# Patient Record
Sex: MALE | Race: WHITE | NOT HISPANIC OR LATINO | Employment: FULL TIME | URBAN - METROPOLITAN AREA
[De-identification: names, ages, dates, MRNs, and addresses within clinical notes are randomized per-mention and may not be internally consistent; named-entity substitution may affect disease eponyms.]

---

## 2018-04-30 ENCOUNTER — OFFICE VISIT (OUTPATIENT)
Dept: FAMILY MEDICINE | Facility: CLINIC | Age: 53
End: 2018-04-30
Attending: FAMILY MEDICINE
Payer: COMMERCIAL

## 2018-04-30 VITALS
TEMPERATURE: 98.2 F | DIASTOLIC BLOOD PRESSURE: 72 MMHG | HEART RATE: 98 BPM | RESPIRATION RATE: 16 BRPM | BODY MASS INDEX: 25.14 KG/M2 | SYSTOLIC BLOOD PRESSURE: 118 MMHG | WEIGHT: 175.6 LBS | HEIGHT: 70 IN | OXYGEN SATURATION: 96 %

## 2018-04-30 DIAGNOSIS — E11.9 CONTROLLED TYPE 2 DIABETES MELLITUS WITHOUT COMPLICATION, UNSPECIFIED LONG TERM INSULIN USE STATUS: Primary | ICD-10-CM

## 2018-04-30 DIAGNOSIS — E78.00 PURE HYPERCHOLESTEROLEMIA: ICD-10-CM

## 2018-04-30 PROBLEM — Z88.6 HISTORY OF ALLERGY TO ASPIRIN: Status: ACTIVE | Noted: 2017-11-29

## 2018-04-30 PROCEDURE — 99213 OFFICE O/P EST LOW 20 MIN: CPT | Performed by: FAMILY MEDICINE

## 2018-04-30 RX ORDER — BLOOD SUGAR DIAGNOSTIC
STRIP MISCELLANEOUS
Refills: 1 | COMMUNITY
Start: 2018-04-05 | End: 2018-09-07 | Stop reason: SDUPTHER

## 2018-04-30 RX ORDER — AZELASTINE HCL 205.5 UG/1
SPRAY NASAL
COMMUNITY
Start: 2017-12-22 | End: 2019-05-06

## 2018-04-30 RX ORDER — LOSARTAN POTASSIUM 25 MG/1
25 TABLET ORAL
COMMUNITY
Start: 2018-01-30 | End: 2018-04-30 | Stop reason: SDUPTHER

## 2018-04-30 RX ORDER — METFORMIN HYDROCHLORIDE EXTENDED-RELEASE TABLETS 500 MG/1
500 TABLET, FILM COATED, EXTENDED RELEASE ORAL 2 TIMES DAILY WITH MEALS
COMMUNITY
Start: 2018-01-30 | End: 2019-03-24 | Stop reason: SDUPTHER

## 2018-04-30 RX ORDER — LOSARTAN POTASSIUM 25 MG/1
25 TABLET ORAL DAILY
Qty: 90 TABLET | Refills: 1 | Status: SHIPPED | OUTPATIENT
Start: 2018-04-30 | End: 2018-09-05 | Stop reason: SDUPTHER

## 2018-04-30 RX ORDER — BENZONATATE 100 MG/1
100 CAPSULE ORAL
COMMUNITY
Start: 2017-12-22 | End: 2019-05-06

## 2018-04-30 RX ORDER — DEXTROSE 4 G
TABLET,CHEWABLE ORAL
COMMUNITY
Start: 2017-10-29

## 2018-04-30 RX ORDER — MINERAL OIL
180 ENEMA (ML) RECTAL
COMMUNITY
Start: 2011-10-01

## 2018-04-30 RX ORDER — ATORVASTATIN CALCIUM 10 MG/1
10 TABLET, FILM COATED ORAL
COMMUNITY
Start: 2018-01-30 | End: 2018-09-06 | Stop reason: SDUPTHER

## 2018-04-30 ASSESSMENT — ENCOUNTER SYMPTOMS
FEVER: 0
ARTHRALGIAS: 0
APPETITE CHANGE: 0
FATIGUE: 0
COUGH: 0
FLANK PAIN: 0
EYE PAIN: 0
DIAPHORESIS: 0
ADENOPATHY: 0
WEAKNESS: 0
SORE THROAT: 0
ABDOMINAL PAIN: 0
PALPITATIONS: 0
UNEXPECTED WEIGHT CHANGE: 0
NAUSEA: 0
DIARRHEA: 0
DIZZINESS: 0
EYE REDNESS: 0
HEADACHES: 0
VOMITING: 0
SHORTNESS OF BREATH: 0
BLOOD IN STOOL: 0
CHEST TIGHTNESS: 0
MYALGIAS: 0
CONSTIPATION: 0
ACTIVITY CHANGE: 0
DYSURIA: 0

## 2018-04-30 NOTE — ASSESSMENT & PLAN NOTE
LDL target is less than 70 and achieved with a atorvastatin.  Next labs in July.  Office visit 6 months if all goes well.

## 2018-04-30 NOTE — PROGRESS NOTES
"Subjective   Doing well, FBS averages 115 on Metformin 2000 mg daily.  He has gained 4 lbs since January, feels fine.His most recent labs were reviewed at the last office visit.  Denies med side effects.     Patient ID: Samuel Patricia is a 52 y.o. male.    HPI    The following have been reviewed and updated as appropriate in this visit:       Review of Systems   Constitutional: Negative for activity change, appetite change, diaphoresis, fatigue, fever and unexpected weight change.   HENT: Negative for congestion and sore throat.    Eyes: Negative for pain, redness and visual disturbance.   Respiratory: Negative for cough, chest tightness and shortness of breath.    Cardiovascular: Negative for chest pain and palpitations.   Gastrointestinal: Negative for abdominal pain, blood in stool, constipation, diarrhea, nausea and vomiting.   Genitourinary: Negative for dysuria and flank pain.   Musculoskeletal: Negative for arthralgias and myalgias.   Skin: Negative for rash.   Neurological: Negative for dizziness, weakness and headaches.   Hematological: Negative for adenopathy.   Psychiatric/Behavioral: Negative for behavioral problems.       Objective     Vitals:    04/30/18 0758   BP: 118/72   BP Location: Left upper arm   Patient Position: Sitting   Pulse: 98   Resp: 16   Temp: 36.8 °C (98.2 °F)   TempSrc: Oral   SpO2: 96%   Weight: 79.7 kg (175 lb 9.6 oz)   Height: 1.778 m (5' 10\")     Body mass index is 25.2 kg/m².    Physical Exam   Constitutional: He appears well-developed and well-nourished. He is cooperative. No distress.   HENT:   Head: Normocephalic and atraumatic.   Right Ear: External ear normal.   Left Ear: External ear normal.   Nose: Nose normal.   Mouth/Throat: Oropharynx is clear and moist.   Eyes: Conjunctivae, EOM and lids are normal. Pupils are equal, round, and reactive to light. Right eye exhibits no chemosis. Left eye exhibits no chemosis. Right conjunctiva is not injected. Left conjunctiva is not " injected.   Fundoscopic exam:       The right eye shows no exudate and no papilledema.        The left eye shows no exudate and no papilledema.   Neck: Full passive range of motion without pain. No JVD present. Carotid bruit is not present. No thyroid mass and no thyromegaly present.   Cardiovascular: Normal rate, regular rhythm and intact distal pulses.  PMI is not displaced.  Exam reveals no gallop and no friction rub.    No murmur heard.  Pulses:       Dorsalis pedis pulses are 2+ on the right side, and 2+ on the left side.        Posterior tibial pulses are 2+ on the right side, and 2+ on the left side.   Pulmonary/Chest: Effort normal and breath sounds normal. No respiratory distress. He has no wheezes. He has no rales. He exhibits no tenderness.   Abdominal: Soft. Bowel sounds are normal. There is no tenderness.   Musculoskeletal:        Right foot: There is no deformity.        Left foot: There is no deformity.   Feet:   Right Foot:   Protective Sensation: 5 sites tested. 5 sites sensed.   Skin Integrity: Negative for ulcer, blister, skin breakdown, erythema or warmth.   Left Foot:   Protective Sensation: 5 sites tested. 5 sites sensed.   Skin Integrity: Negative for ulcer, blister, skin breakdown, erythema or warmth.   Lymphadenopathy:        Head (right side): No tonsillar adenopathy present.        Head (left side): No tonsillar adenopathy present.     He has no cervical adenopathy.     He has no axillary adenopathy.   Neurological: He is alert. He has normal reflexes. No cranial nerve deficit or sensory deficit.   Skin: Skin is warm and dry. No lesion and no rash noted.   Psychiatric: He has a normal mood and affect. His speech is normal and behavior is normal. Cognition and memory are normal.       Assessment/Plan   Problem List Items Addressed This Visit     Controlled type 2 diabetes mellitus without complication (CMS/HCC) (HCC) - Primary     He is doing well, next labs are due in July, orders  provided.  Continue same management         Relevant Medications    metFORMIN (FORTAMET) 500 mg 24 hr tablet    Other Relevant Orders    Comprehensive metabolic panel    Hemoglobin A1c    Microalbumin/Creatinine Ur Random    Pure hypercholesterolemia     LDL target is less than 70 and achieved with a atorvastatin.  Next labs in July.  Office visit 6 months if all goes well.         Relevant Medications    atorvastatin (LIPITOR) 10 mg tablet    Other Relevant Orders    Lipid panel

## 2018-07-25 LAB
ALBUMIN SERPL-MCNC: 4.6 G/DL (ref 3.5–5.5)
ALBUMIN/CREAT UR: 9 MG/G CREAT (ref 0–30)
ALBUMIN/GLOB SERPL: 1.7 {RATIO} (ref 1.2–2.2)
ALP SERPL-CCNC: 55 IU/L (ref 39–117)
ALT SERPL-CCNC: 33 IU/L (ref 0–44)
AST SERPL-CCNC: 49 IU/L (ref 0–40)
BILIRUB SERPL-MCNC: 0.6 MG/DL (ref 0–1.2)
BUN SERPL-MCNC: 10 MG/DL (ref 6–24)
BUN/CREAT SERPL: 12 (ref 9–20)
CALCIUM SERPL-MCNC: 9.5 MG/DL (ref 8.7–10.2)
CHLORIDE SERPL-SCNC: 103 MMOL/L (ref 96–106)
CHOLEST SERPL-MCNC: 159 MG/DL (ref 100–199)
CO2 SERPL-SCNC: 24 MMOL/L (ref 20–29)
CREAT SERPL-MCNC: 0.86 MG/DL (ref 0.76–1.27)
CREAT UR-MCNC: 175.6 MG/DL
GLOBULIN SER CALC-MCNC: 2.7 G/DL (ref 1.5–4.5)
GLUCOSE SERPL-MCNC: 125 MG/DL (ref 65–99)
HBA1C MFR BLD: 5.5 % (ref 4.8–5.6)
HDLC SERPL-MCNC: 59 MG/DL
LAB CORP EGFR IF AFRICN AM: 116 ML/MIN/1.73
LAB CORP EGFR IF NONAFRICN AM: 100 ML/MIN/1.73
LDLC SERPL CALC-MCNC: 87 MG/DL (ref 0–99)
MICROALBUMIN UR-MCNC: 15.8 UG/ML
POTASSIUM SERPL-SCNC: 4.6 MMOL/L (ref 3.5–5.2)
PROT SERPL-MCNC: 7.3 G/DL (ref 6–8.5)
SODIUM SERPL-SCNC: 143 MMOL/L (ref 134–144)
TRIGL SERPL-MCNC: 67 MG/DL (ref 0–149)
VLDLC SERPL CALC-MCNC: 13 MG/DL (ref 5–40)

## 2018-07-26 ENCOUNTER — TELEPHONE (OUTPATIENT)
Dept: FAMILY MEDICINE | Facility: CLINIC | Age: 53
End: 2018-07-26

## 2018-07-26 NOTE — TELEPHONE ENCOUNTER
Left detailed message for pt informing him of lab results. I instducted him to call the office if he has any questions.      ----- Message from Ash Nam MD sent at 7/26/2018 11:01 AM EDT -----  Notify patient of lab results.  Lipid levels have crept up above target, LDL was 71 6 months ago now to 87: Try to tighten up on the diet to get that back down without additional medications.  Glucose and A1c indicate well-controlled diabetes with fasting glucose above normal at 125 but hemoglobin A1c has actually dropped from 6.0-5.5.  1 of the liver tests, the AST, is slightly above normal at 49 but not worrisome.  Stick with current regimen at this time.

## 2018-07-27 ENCOUNTER — TELEPHONE (OUTPATIENT)
Dept: FAMILY MEDICINE | Facility: CLINIC | Age: 53
End: 2018-07-27

## 2018-07-27 NOTE — TELEPHONE ENCOUNTER
Already completed.     ----- Message from Ash Nam MD sent at 7/26/2018 11:01 AM EDT -----  Notify patient of lab results.  Lipid levels have crept up above target, LDL was 71 6 months ago now to 87: Try to tighten up on the diet to get that back down without additional medications.  Glucose and A1c indicate well-controlled diabetes with fasting glucose above normal at 125 but hemoglobin A1c has actually dropped from 6.0-5.5.  1 of the liver tests, the AST, is slightly above normal at 49 but not worrisome.  Stick with current regimen at this time.

## 2018-09-06 RX ORDER — LOSARTAN POTASSIUM 25 MG/1
25 TABLET ORAL DAILY
Qty: 90 TABLET | Refills: 0 | Status: SHIPPED | OUTPATIENT
Start: 2018-09-06 | End: 2019-03-24 | Stop reason: SDUPTHER

## 2018-09-06 RX ORDER — ATORVASTATIN CALCIUM 10 MG/1
10 TABLET, FILM COATED ORAL DAILY
Qty: 90 TABLET | Refills: 0 | Status: SHIPPED | OUTPATIENT
Start: 2018-09-06 | End: 2018-11-11 | Stop reason: SDUPTHER

## 2018-09-06 NOTE — TELEPHONE ENCOUNTER
Pt req refills.  Last seen 4/2018  Labs end of July adv to cont the current regimen  Due for f/up appt end of Oct,  Pt due in 10/29/18

## 2018-09-07 RX ORDER — BLOOD SUGAR DIAGNOSTIC
STRIP MISCELLANEOUS
Qty: 100 STRIP | Refills: 1 | Status: SHIPPED | OUTPATIENT
Start: 2018-09-07 | End: 2021-01-29 | Stop reason: SDUPTHER

## 2018-10-29 ENCOUNTER — OFFICE VISIT (OUTPATIENT)
Dept: FAMILY MEDICINE | Facility: CLINIC | Age: 53
End: 2018-10-29
Payer: COMMERCIAL

## 2018-10-29 VITALS
HEART RATE: 86 BPM | BODY MASS INDEX: 25.05 KG/M2 | DIASTOLIC BLOOD PRESSURE: 70 MMHG | SYSTOLIC BLOOD PRESSURE: 122 MMHG | RESPIRATION RATE: 16 BRPM | HEIGHT: 70 IN | OXYGEN SATURATION: 98 % | WEIGHT: 175 LBS

## 2018-10-29 DIAGNOSIS — E11.9 CONTROLLED TYPE 2 DIABETES MELLITUS WITHOUT COMPLICATION, WITHOUT LONG-TERM CURRENT USE OF INSULIN (CMS/HCC): Primary | ICD-10-CM

## 2018-10-29 DIAGNOSIS — S39.012A SACROILIAC STRAIN, INITIAL ENCOUNTER: ICD-10-CM

## 2018-10-29 DIAGNOSIS — Z23 NEED FOR INFLUENZA VACCINATION: ICD-10-CM

## 2018-10-29 DIAGNOSIS — E78.00 PURE HYPERCHOLESTEROLEMIA: ICD-10-CM

## 2018-10-29 DIAGNOSIS — Z12.9 SCREENING OF CANCER: ICD-10-CM

## 2018-10-29 PROCEDURE — 90471 IMMUNIZATION ADMIN: CPT | Performed by: FAMILY MEDICINE

## 2018-10-29 PROCEDURE — 99213 OFFICE O/P EST LOW 20 MIN: CPT | Mod: 25 | Performed by: FAMILY MEDICINE

## 2018-10-29 PROCEDURE — 90686 IIV4 VACC NO PRSV 0.5 ML IM: CPT | Performed by: FAMILY MEDICINE

## 2018-10-29 ASSESSMENT — ENCOUNTER SYMPTOMS
FEVER: 0
CHILLS: 0
COUGH: 0
HEMATURIA: 0
ACTIVITY CHANGE: 0
WOUND: 0
FLANK PAIN: 0
SPEECH DIFFICULTY: 0
ABDOMINAL PAIN: 0
NUMBNESS: 0
NAUSEA: 0
EYE PAIN: 0
SHORTNESS OF BREATH: 0
ENDOCRINE COMMENTS: DENIES SEVERE HYPOGLYCEMIA, OR SYMPTOMS OF  DIZZINESS, CONFUSION, SYNCOPE, TREMOR.
SORE THROAT: 0
POLYDIPSIA: 0
APPETITE CHANGE: 0
FREQUENCY: 0
DIAPHORESIS: 0
BLOOD IN STOOL: 0
TREMORS: 0
CONSTIPATION: 0
JOINT SWELLING: 0
DIARRHEA: 0
ARTHRALGIAS: 0
EYE REDNESS: 0
HEADACHES: 0
TROUBLE SWALLOWING: 0
DIZZINESS: 0
UNEXPECTED WEIGHT CHANGE: 0
SINUS PRESSURE: 0
DIFFICULTY URINATING: 0
PALPITATIONS: 0
FATIGUE: 0
WEAKNESS: 0

## 2018-10-29 NOTE — PATIENT INSTRUCTIONS
Stay on current meds.  Labs in mid January.    Controlled type 2 diabetes mellitus without complication (CMS/HCC) (HCC)  Metformin plus diet.Under excellent control overall.  Stick with    Need for influenza vaccination  Flu vaccine administered.    Sacroiliac strain  Conservative management with rest, ibuprofen versus Tylenol as needed, consider chiropractic.  If not improved within 2 weeks call for x-ray orders.

## 2018-10-29 NOTE — PROGRESS NOTES
"Subjective  Returns for follow-up of diabetes mellitus hyperlipidemia.  He has never been hypertensive and takes low-dose losartan for renal protective effect.  Last labs were in July 2018:    FBS runs 110-122, last A1C  In July 5.5.  Diet is well controlled.      LDL has  Climbed to high 80s.    Recently pulled  His low back to  Right ischial region.  This is occurred in the past and responded to chiropractic.  Pain is gradually improving.     Patient ID: Samuel Patricia is a 53 y.o. male.  1965      HPI    The following have been reviewed and updated as appropriate in this visit:       Review of Systems   Constitutional: Negative for activity change, appetite change, chills, diaphoresis, fatigue, fever and unexpected weight change.   HENT: Negative for ear pain, mouth sores, sinus pressure, sore throat and trouble swallowing.    Eyes: Negative for pain, redness and visual disturbance.   Respiratory: Negative for cough and shortness of breath.    Cardiovascular: Negative for chest pain and palpitations.   Gastrointestinal: Negative for abdominal pain, blood in stool, constipation, diarrhea and nausea.   Endocrine: Negative for polydipsia and polyuria.        Denies severe hypoglycemia, or symptoms of  dizziness, confusion, syncope, tremor.   Genitourinary: Negative for difficulty urinating, flank pain, frequency, hematuria and urgency.   Musculoskeletal: Negative for arthralgias and joint swelling.   Skin: Negative for rash and wound.   Neurological: Negative for dizziness, tremors, syncope, speech difficulty, weakness, numbness and headaches.       Objective     Vitals:    10/29/18 0800   BP: 122/70   BP Location: Right upper arm   Patient Position: Sitting   Pulse: 86   Resp: 16   SpO2: 98%   Weight: 79.4 kg (175 lb)   Height: 1.778 m (5' 10\")     Body mass index is 25.11 kg/m².    Physical Exam   Constitutional: He appears well-developed and well-nourished. He is cooperative. No distress.   HENT:   Head: " Normocephalic and atraumatic.   Right Ear: External ear normal.   Left Ear: External ear normal.   Nose: Nose normal.   Mouth/Throat: Oropharynx is clear and moist.   Eyes: Conjunctivae, EOM and lids are normal. Pupils are equal, round, and reactive to light. Right eye exhibits no chemosis. Left eye exhibits no chemosis. Right conjunctiva is not injected. Left conjunctiva is not injected.   Fundoscopic exam:       The right eye shows no exudate and no papilledema.        The left eye shows no exudate and no papilledema.   Neck: Full passive range of motion without pain. No JVD present. Carotid bruit is not present. No thyroid mass and no thyromegaly present.   Cardiovascular: Normal rate, regular rhythm and intact distal pulses.  PMI is not displaced.  Exam reveals no gallop and no friction rub.    No murmur heard.  Pulses:       Dorsalis pedis pulses are 2+ on the right side, and 2+ on the left side.        Posterior tibial pulses are 2+ on the right side, and 2+ on the left side.   Pulmonary/Chest: Effort normal and breath sounds normal. No respiratory distress. He has no wheezes. He has no rales. He exhibits no tenderness.   Abdominal: Soft. Bowel sounds are normal. There is no tenderness.   Musculoskeletal:        Right foot: There is no deformity.        Left foot: There is no deformity.   Low back exam: Mild tenderness at right sacroiliac joint no midline spinal tenderness.  Straight leg raises are negative to 90 degrees, lower extremity sensation motor reflexes were all within normal limits.  Intact vibratory and filament sensory exam as noted.  Minor tenderness over ischial tuberosity/ischial bursa   Feet:   Right Foot:   Protective Sensation: 5 sites tested. 5 sites sensed.   Skin Integrity: Negative for ulcer, blister, skin breakdown, erythema or warmth.   Left Foot:   Protective Sensation: 5 sites tested. 5 sites sensed.   Skin Integrity: Negative for ulcer, blister, skin breakdown, erythema or warmth.    Lymphadenopathy:        Head (right side): No tonsillar adenopathy present.        Head (left side): No tonsillar adenopathy present.     He has no cervical adenopathy.     He has no axillary adenopathy.   Neurological: He is alert. He has normal reflexes. No cranial nerve deficit or sensory deficit.   Skin: Skin is warm and dry. No lesion and no rash noted.   Psychiatric: He has a normal mood and affect. His speech is normal and behavior is normal. Cognition and memory are normal.       Assessment/Plan   Problem List Items Addressed This Visit     Controlled type 2 diabetes mellitus without complication (CMS/HCC) (HCC) - Primary     Metformin plus diet.Under excellent control overall.  Stick with         Relevant Orders    Comprehensive metabolic panel    Lipid panel    Hemoglobin A1c    Microalbumin/Creatinine Ur Random    Pure hypercholesterolemia    Relevant Orders    Lipid panel    Need for influenza vaccination     Flu vaccine administered.         Relevant Orders    Influenza vaccine quadrivalent preservative free 6 mon and older IM (FluLaval) (Completed)    Screening of cancer    Relevant Orders    PSA    Sacroiliac strain     Conservative management with rest, ibuprofen versus Tylenol as needed, consider chiropractic.  If not improved within 2 weeks call for x-ray orders.

## 2018-12-05 ENCOUNTER — TELEPHONE (OUTPATIENT)
Dept: FAMILY MEDICINE | Facility: CLINIC | Age: 53
End: 2018-12-05

## 2018-12-05 ENCOUNTER — OFFICE VISIT (OUTPATIENT)
Dept: FAMILY MEDICINE | Facility: CLINIC | Age: 53
End: 2018-12-05
Payer: COMMERCIAL

## 2018-12-05 VITALS
SYSTOLIC BLOOD PRESSURE: 120 MMHG | HEART RATE: 98 BPM | TEMPERATURE: 98.1 F | HEIGHT: 69 IN | RESPIRATION RATE: 16 BRPM | DIASTOLIC BLOOD PRESSURE: 70 MMHG | WEIGHT: 174 LBS | BODY MASS INDEX: 25.77 KG/M2 | OXYGEN SATURATION: 100 %

## 2018-12-05 DIAGNOSIS — M53.3 SACROILIAC JOINT DYSFUNCTION OF RIGHT SIDE: Primary | ICD-10-CM

## 2018-12-05 PROCEDURE — 99213 OFFICE O/P EST LOW 20 MIN: CPT | Performed by: FAMILY MEDICINE

## 2018-12-05 RX ORDER — NAPROXEN 500 MG/1
500 TABLET ORAL 2 TIMES DAILY WITH MEALS
Qty: 30 TABLET | Refills: 0 | Status: SHIPPED | OUTPATIENT
Start: 2018-12-05 | End: 2019-06-03

## 2018-12-05 NOTE — PATIENT INSTRUCTIONS
Sacroiliac joint dysfunction of right side  Conservative management is advised with chiropractic treatment.  I reviewed his drug allergy history and it is actually quite equivocal in that, while he thinks he is allergic to aspirin from 40 years ago, has had at one time nasal congestion after ibuprofen also states that he has taken Advil numerous times without any reaction whatsoever.  His aspirin allergy was likewise described as nasal congestion only, no rash no epistaxis no anaphylaxis.  Based on that, he agrees to close observation on trial of Naprosyn therapy but will discontinue it if he develops any recurrence of nasal congestion/obstruction or other allergy related symptoms.    I advised him that corticosteroid injection is not indicated at this early juncture but if conservative management fails might be a consideration.    We discussed the possibility of getting fresh set of x-rays on the spine but will proceed only if he does not achieve rapid improvement over the next week or so.

## 2018-12-05 NOTE — ASSESSMENT & PLAN NOTE
Conservative management is advised with chiropractic treatment.  I reviewed his drug allergy history and it is actually quite equivocal in that, while he thinks he is allergic to aspirin from 40 years ago, has had at one time nasal congestion after ibuprofen also states that he has taken Advil numerous times without any reaction whatsoever.  His aspirin allergy was likewise described as nasal congestion only, no rash no epistaxis no anaphylaxis.  Based on that, he agrees to close observation on trial of Naprosyn therapy but will discontinue it if he develops any recurrence of nasal congestion/obstruction or other allergy related symptoms.    I advised him that corticosteroid injection is not indicated at this early juncture but if conservative management fails might be a consideration.    We discussed the possibility of getting fresh set of x-rays on the spine but will proceed only if he does not achieve rapid improvement over the next week or so.

## 2018-12-05 NOTE — PROGRESS NOTES
"Subjective  Over the years this patient has had perhaps 3 flareups of right SI pain.,  He had new onset of pain [place], denies lifting or overuse/ lifting.  Pain kept him awake last night, and he wondered if he was a candidate for injection.      Used Chiropractor [facility] for same in past but felt the program was stretched out unnecessarily.    Denies referred pain to leg/ buttocks.   Last xrays over 10 yrs ago per pt.     Patient ID: Samuel Patricia is a 53 y.o. male.  1965      HPI    The following have been reviewed and updated as appropriate in this visit:       Review of Systems    Objective     Vitals:    12/05/18 1006   BP: 120/70   BP Location: Right upper arm   Patient Position: Sitting   Pulse: 98   Resp: 16   Temp: 36.7 °C (98.1 °F)   SpO2: 100%   Weight: 78.9 kg (174 lb)   Height: 1.753 m (5' 9\")     Body mass index is 25.7 kg/m².    Physical Exam   Musculoskeletal:   Low back exam exhibits some localized midline spinal tenderness around L2-3, but the focus of his greatest complaint is at the right sacroiliac joint, superior aspect, where he exhibits moderate tenderness, symmetrical sacroiliac cysts are present.  Straight leg raises are negative to 90 degrees, lower extremity sensation motor reflexes are intact.       Assessment/Plan   Problem List Items Addressed This Visit     Sacroiliac joint dysfunction of right side - Primary     Conservative management is advised with chiropractic treatment.  I reviewed his drug allergy history and it is actually quite equivocal in that, while he thinks he is allergic to aspirin from 40 years ago, has had at one time nasal congestion after ibuprofen also states that he has taken Advil numerous times without any reaction whatsoever.  His aspirin allergy was likewise described as nasal congestion only, no rash no epistaxis no anaphylaxis.  Based on that, he agrees to close observation on trial of Naprosyn therapy but will " discontinue it if he develops any recurrence of nasal congestion/obstruction or other allergy related symptoms.    I advised him that corticosteroid injection is not indicated at this early juncture but if conservative management fails might be a consideration.    We discussed the possibility of getting fresh set of x-rays on the spine but will proceed only if he does not achieve rapid improvement over the next week or so.         Relevant Orders    Ambulatory referral to Chiropractic

## 2018-12-05 NOTE — TELEPHONE ENCOUNTER
Patient called, still having SI joint pain- had been manageable, only slight pain but today woke up and it is much worse.    Please advise    [phone]    dk    Patient scheduled appt for 10am today

## 2019-03-24 RX ORDER — LOSARTAN POTASSIUM 25 MG/1
25 TABLET ORAL DAILY
Qty: 90 TABLET | Refills: 0 | Status: SHIPPED | OUTPATIENT
Start: 2019-03-24 | End: 2019-05-06 | Stop reason: SDUPTHER

## 2019-03-24 RX ORDER — METFORMIN HYDROCHLORIDE EXTENDED-RELEASE TABLETS 500 MG/1
500 TABLET, FILM COATED, EXTENDED RELEASE ORAL 2 TIMES DAILY WITH MEALS
Qty: 90 TABLET | Refills: 0 | Status: SHIPPED | OUTPATIENT
Start: 2019-03-24 | End: 2019-05-06

## 2019-04-19 LAB
ALBUMIN SERPL-MCNC: 4.7 G/DL (ref 3.5–5.5)
ALBUMIN/GLOB SERPL: 1.7 {RATIO} (ref 1.2–2.2)
ALP SERPL-CCNC: 60 IU/L (ref 39–117)
ALT SERPL-CCNC: 23 IU/L (ref 0–44)
AST SERPL-CCNC: 34 IU/L (ref 0–40)
BILIRUB SERPL-MCNC: 0.8 MG/DL (ref 0–1.2)
BUN SERPL-MCNC: 13 MG/DL (ref 6–24)
BUN/CREAT SERPL: 15 (ref 9–20)
CALCIUM SERPL-MCNC: 9.6 MG/DL (ref 8.7–10.2)
CHLORIDE SERPL-SCNC: 104 MMOL/L (ref 96–106)
CHOLEST SERPL-MCNC: 119 MG/DL (ref 100–199)
CO2 SERPL-SCNC: 21 MMOL/L (ref 20–29)
CREAT SERPL-MCNC: 0.89 MG/DL (ref 0.76–1.27)
GLOBULIN SER CALC-MCNC: 2.7 G/DL (ref 1.5–4.5)
GLUCOSE SERPL-MCNC: 125 MG/DL (ref 65–99)
HBA1C MFR BLD: 5.8 % (ref 4.8–5.6)
HDLC SERPL-MCNC: 48 MG/DL
LAB CORP EGFR IF AFRICN AM: 114 ML/MIN/1.73
LAB CORP EGFR IF NONAFRICN AM: 98 ML/MIN/1.73
LDLC SERPL CALC-MCNC: 57 MG/DL (ref 0–99)
POTASSIUM SERPL-SCNC: 4.3 MMOL/L (ref 3.5–5.2)
PROT SERPL-MCNC: 7.4 G/DL (ref 6–8.5)
SODIUM SERPL-SCNC: 141 MMOL/L (ref 134–144)
TRIGL SERPL-MCNC: 70 MG/DL (ref 0–149)
VLDLC SERPL CALC-MCNC: 14 MG/DL (ref 5–40)

## 2019-04-20 LAB
ALBUMIN/CREAT UR: 15.3 MG/G CREAT (ref 0–30)
CREAT UR-MCNC: 222.9 MG/DL
MICROALBUMIN UR-MCNC: 34.2 UG/ML
PSA SERPL-MCNC: 0.5 NG/ML (ref 0–4)

## 2019-05-03 RX ORDER — METFORMIN HYDROCHLORIDE 500 MG/1
TABLET, EXTENDED RELEASE ORAL
COMMUNITY
Start: 2019-01-25 | End: 2019-05-06 | Stop reason: SDUPTHER

## 2019-05-06 ENCOUNTER — OFFICE VISIT (OUTPATIENT)
Dept: FAMILY MEDICINE | Facility: CLINIC | Age: 54
End: 2019-05-06
Payer: COMMERCIAL

## 2019-05-06 VITALS
TEMPERATURE: 98.6 F | SYSTOLIC BLOOD PRESSURE: 132 MMHG | DIASTOLIC BLOOD PRESSURE: 86 MMHG | OXYGEN SATURATION: 98 % | BODY MASS INDEX: 26.22 KG/M2 | HEIGHT: 69 IN | WEIGHT: 177 LBS | RESPIRATION RATE: 18 BRPM | HEART RATE: 87 BPM

## 2019-05-06 DIAGNOSIS — E11.9 CONTROLLED TYPE 2 DIABETES MELLITUS WITHOUT COMPLICATION, WITHOUT LONG-TERM CURRENT USE OF INSULIN (CMS/HCC): Primary | ICD-10-CM

## 2019-05-06 DIAGNOSIS — E78.00 PURE HYPERCHOLESTEROLEMIA: ICD-10-CM

## 2019-05-06 DIAGNOSIS — I10 ESSENTIAL HYPERTENSION: ICD-10-CM

## 2019-05-06 PROCEDURE — 99213 OFFICE O/P EST LOW 20 MIN: CPT | Performed by: FAMILY MEDICINE

## 2019-05-06 RX ORDER — ATORVASTATIN CALCIUM 10 MG/1
10 TABLET, FILM COATED ORAL
Qty: 90 TABLET | Refills: 3 | Status: SHIPPED | OUTPATIENT
Start: 2019-05-06 | End: 2020-03-31

## 2019-05-06 RX ORDER — METFORMIN HYDROCHLORIDE 500 MG/1
1000 TABLET, EXTENDED RELEASE ORAL 2 TIMES DAILY WITH MEALS
Qty: 360 TABLET | Refills: 3 | Status: SHIPPED | OUTPATIENT
Start: 2019-05-06 | End: 2020-06-15

## 2019-05-06 RX ORDER — LOSARTAN POTASSIUM 25 MG/1
25 TABLET ORAL DAILY
Qty: 90 TABLET | Refills: 1 | Status: SHIPPED | OUTPATIENT
Start: 2019-05-06 | End: 2019-09-14 | Stop reason: SDUPTHER

## 2019-05-06 ASSESSMENT — ENCOUNTER SYMPTOMS
SPEECH DIFFICULTY: 0
TROUBLE SWALLOWING: 0
EYE REDNESS: 0
APPETITE CHANGE: 0
ENDOCRINE COMMENTS: DENIES SEVERE HYPOGLYCEMIA, OR SYMPTOMS OF  DIZZINESS, CONFUSION, SYNCOPE, TREMOR.
WOUND: 0
DIAPHORESIS: 0
UNEXPECTED WEIGHT CHANGE: 0
COUGH: 0
POLYDIPSIA: 0
SHORTNESS OF BREATH: 0
NAUSEA: 0
CHEST TIGHTNESS: 0
HEADACHES: 0
ADENOPATHY: 0
DIARRHEA: 0
FATIGUE: 0
ARTHRALGIAS: 0
SINUS PRESSURE: 0
CONSTIPATION: 0
FEVER: 0
TREMORS: 0
ACTIVITY CHANGE: 0
DYSURIA: 0
WEAKNESS: 0
FREQUENCY: 0
FLANK PAIN: 0
EYE PAIN: 0
NUMBNESS: 0
DIFFICULTY URINATING: 0
VOMITING: 0
CHILLS: 0
JOINT SWELLING: 0
SORE THROAT: 0
DIZZINESS: 0
PALPITATIONS: 0
BLOOD IN STOOL: 0
HEMATURIA: 0
ABDOMINAL PAIN: 0

## 2019-05-06 NOTE — PROGRESS NOTES
Subjective  Patient returns for follow-up of diabetes, hyperlipidemia,.  Labs from last week showed good diabetes control with A1c of 5.8, fasting glucose 125, good lipids, at target with LDL 57 HDL 48 normal triglycerides, normal liver and kidney tests, negative microalbumin, normal PSA at 0.5.    He reports  occasional  Lightheadedness when working outdoors.  He has not followed home blood pressures.    He reports good response to chiropractic deep tissue massage in December.  ( albeit with bruising).     Patient ID: Samuel Patricia is a 53 y.o. male.  1965      HPI    The following have been reviewed and updated as appropriate in this visit:  Tobacco  Allergies  Meds  Problems       Review of Systems   Constitutional: Negative for activity change, appetite change, chills, diaphoresis, fatigue, fever and unexpected weight change (NO UNEXPLAINED WEIGHT LOSS).   HENT: Negative for ear pain, mouth sores, sinus pressure, sore throat and trouble swallowing.    Eyes: Negative for pain, redness and visual disturbance.   Respiratory: Negative for cough, chest tightness and shortness of breath.    Cardiovascular: Negative for chest pain and palpitations.   Gastrointestinal: Negative for abdominal pain, blood in stool, constipation, diarrhea, nausea and vomiting.   Endocrine: Negative for polydipsia and polyuria.        Denies severe hypoglycemia, or symptoms of  dizziness, confusion, syncope, tremor.   Genitourinary: Negative for difficulty urinating, dysuria, flank pain, frequency, hematuria and urgency.   Musculoskeletal: Negative for arthralgias and joint swelling.   Skin: Negative for rash and wound.   Neurological: Negative for dizziness, tremors, syncope, speech difficulty, weakness, numbness and headaches.   Hematological: Negative for adenopathy.       Objective     Vitals:    05/06/19 0806 05/06/19 0834 05/06/19 0835 05/06/19 0839   BP: 132/78 (!) 158/100 126/86 132/86   BP Location: Left upper arm Right  "upper arm  Right upper arm   Patient Position: Sitting      Pulse: 87      Resp: 18      Temp: 37 °C (98.6 °F)      TempSrc: Oral      SpO2: 98%      Weight: 80.3 kg (177 lb)      Height: 1.753 m (5' 9.02\")        Body mass index is 26.13 kg/m².    Physical Exam   Constitutional: He appears well-developed and well-nourished. He is cooperative. No distress.   HENT:   Head: Normocephalic and atraumatic.   Right Ear: External ear normal.   Left Ear: External ear normal.   Nose: Nose normal.   Mouth/Throat: Oropharynx is clear and moist.   Eyes: Pupils are equal, round, and reactive to light. Conjunctivae, EOM and lids are normal. Right eye exhibits no chemosis. Left eye exhibits no chemosis. Right conjunctiva is not injected. Left conjunctiva is not injected.   Fundoscopic exam:       The right eye shows no exudate and no papilledema.        The left eye shows no exudate and no papilledema.   Neck: Full passive range of motion without pain. No JVD present. Carotid bruit is not present. No thyroid mass and no thyromegaly present.   Cardiovascular: Normal rate, regular rhythm and intact distal pulses.  PMI is not displaced.  Exam reveals no gallop and no friction rub.    No murmur heard.  Pulses:       Dorsalis pedis pulses are 2+ on the right side, and 2+ on the left side.        Posterior tibial pulses are 2+ on the right side, and 2+ on the left side.   Pulmonary/Chest: Effort normal and breath sounds normal. No respiratory distress. He has no wheezes. He has no rales. He exhibits no tenderness.   Abdominal: Soft. Bowel sounds are normal. There is no tenderness.   Musculoskeletal:        Right foot: There is no deformity.        Left foot: There is no deformity.   Feet:   Right Foot:   Protective Sensation: 5 sites tested. 5 sites sensed.   Skin Integrity: Negative for ulcer, blister, skin breakdown, erythema or warmth.   Left Foot:   Protective Sensation: 5 sites tested. 5 sites sensed.   Skin Integrity: Negative for " ulcer, blister, skin breakdown, erythema or warmth.   Lymphadenopathy:        Head (right side): No tonsillar adenopathy present.        Head (left side): No tonsillar adenopathy present.     He has no cervical adenopathy.     He has no axillary adenopathy.   Neurological: He is alert. He has normal reflexes. No cranial nerve deficit or sensory deficit.   Skin: Skin is warm and dry. No lesion and no rash noted.   Psychiatric: He has a normal mood and affect. His speech is normal and behavior is normal. Cognition and memory are normal.       Assessment/Plan   Diagnoses and all orders for this visit:    Controlled type 2 diabetes mellitus without complication, without long-term current use of insulin (CMS/[facility]) (Primary)  Assessment & Plan:  Well-controlled with A1c 5.8 fasting glucose 125.  Continue current meds, follow-up 6 months.stay on low carb diet      Pure hypercholesterolemia  Assessment & Plan:  Lipids are at target with LDL 57.  Continue current Rx with a atorvastatin.      Essential hypertension  Assessment & Plan:  Resume Losartan,    Return in 4 weeks    We  reviewed our previous discussion on hypertension related morbidity, mortality, rationale for therapy, current meds and potential side effects of same, as well as nonpharmacologic measures which should be continued to help optimize blood pressure control including low-salt diet, regular cardio exercise, adequate hydration, stress management.    Follow-up as directed  .      Other orders  -     metFORMIN XR (GLUCOPHAGE-XR) 500 mg 24 hr tablet; Take 2 tablets (1,000 mg total) by mouth 2 (two) times a day with meals.  -     atorvastatin (LIPITOR) 10 mg tablet; Take 1 tablet (10 mg total) by mouth once daily.  -     losartan (COZAAR) 25 mg tablet; Take 1 tablet (25 mg total) by mouth daily.

## 2019-05-06 NOTE — ASSESSMENT & PLAN NOTE
Resume Losartan,    Return in 4 weeks    We  reviewed our previous discussion on hypertension related morbidity, mortality, rationale for therapy, current meds and potential side effects of same, as well as nonpharmacologic measures which should be continued to help optimize blood pressure control including low-salt diet, regular cardio exercise, adequate hydration, stress management.    Follow-up as directed  .

## 2019-05-06 NOTE — ASSESSMENT & PLAN NOTE
Well-controlled with A1c 5.8 fasting glucose 125.  Continue current meds, follow-up 6 months.stay on low carb diet

## 2019-05-06 NOTE — PATIENT INSTRUCTIONS
Controlled type 2 diabetes mellitus without complication (CMS/HCC) (HCC)  Well controlled: stay on low carb diet    Essential hypertension  Resume Losartan,    Return in 4 weeks    We  reviewed our previous discussion on hypertension related morbidity, mortality, rationale for therapy, current meds and potential side effects of same, as well as nonpharmacologic measures which should be continued to help optimize blood pressure control including low-salt diet, regular cardio exercise, adequate hydration, stress management.    Follow-up as directed  .

## 2019-06-07 ENCOUNTER — OFFICE VISIT (OUTPATIENT)
Dept: FAMILY MEDICINE | Facility: CLINIC | Age: 54
End: 2019-06-07
Payer: COMMERCIAL

## 2019-06-07 VITALS
SYSTOLIC BLOOD PRESSURE: 114 MMHG | DIASTOLIC BLOOD PRESSURE: 74 MMHG | RESPIRATION RATE: 18 BRPM | TEMPERATURE: 98.2 F | HEART RATE: 100 BPM | WEIGHT: 171 LBS | HEIGHT: 69 IN | BODY MASS INDEX: 25.33 KG/M2 | OXYGEN SATURATION: 98 %

## 2019-06-07 DIAGNOSIS — I10 ESSENTIAL HYPERTENSION: Primary | ICD-10-CM

## 2019-06-07 DIAGNOSIS — Z23 NEED FOR CHICKENPOX VACCINATION: ICD-10-CM

## 2019-06-07 DIAGNOSIS — E11.9 CONTROLLED TYPE 2 DIABETES MELLITUS WITHOUT COMPLICATION, WITHOUT LONG-TERM CURRENT USE OF INSULIN (CMS/HCC): ICD-10-CM

## 2019-06-07 PROCEDURE — 99213 OFFICE O/P EST LOW 20 MIN: CPT | Performed by: FAMILY MEDICINE

## 2019-11-27 LAB — HBA1C MFR BLD: 5.9 % (ref 4.8–5.6)

## 2019-11-28 LAB — VZV IGG SER IA-ACNC: 599 INDEX

## 2019-12-05 ENCOUNTER — OFFICE VISIT (OUTPATIENT)
Dept: FAMILY MEDICINE | Facility: CLINIC | Age: 54
End: 2019-12-05
Payer: COMMERCIAL

## 2019-12-05 VITALS
HEIGHT: 69 IN | OXYGEN SATURATION: 97 % | WEIGHT: 177.6 LBS | BODY MASS INDEX: 26.31 KG/M2 | DIASTOLIC BLOOD PRESSURE: 72 MMHG | SYSTOLIC BLOOD PRESSURE: 128 MMHG | RESPIRATION RATE: 18 BRPM | HEART RATE: 104 BPM | TEMPERATURE: 99.5 F

## 2019-12-05 DIAGNOSIS — J32.9 RHINOSINUSITIS: Primary | ICD-10-CM

## 2019-12-05 PROCEDURE — 99213 OFFICE O/P EST LOW 20 MIN: CPT | Performed by: NURSE PRACTITIONER

## 2019-12-05 RX ORDER — AZELASTINE HCL 205.5 UG/1
1 SPRAY NASAL 2 TIMES DAILY PRN
Qty: 30 ML | Refills: 1 | Status: SHIPPED | OUTPATIENT
Start: 2019-12-05 | End: 2020-06-02

## 2019-12-05 RX ORDER — BENZONATATE 100 MG/1
CAPSULE ORAL
Qty: 30 CAPSULE | Refills: 0 | Status: SHIPPED | OUTPATIENT
Start: 2019-12-05 | End: 2020-07-09

## 2019-12-05 ASSESSMENT — ENCOUNTER SYMPTOMS
SHORTNESS OF BREATH: 0
FATIGUE: 1
FEVER: 0
NAUSEA: 0
LIGHT-HEADEDNESS: 1
CONSTIPATION: 0
ABDOMINAL PAIN: 0
VOMITING: 0
COUGH: 1
CHILLS: 0
DIARRHEA: 0
WHEEZING: 0

## 2019-12-05 NOTE — PROGRESS NOTES
[facility]  [address] [place] [address]  [phone]     Reason for visit:   Chief Complaint   Patient presents with   • Cough     dry cough x 5 days, no shortness of breath, fatigued from sleep disturbance r/t cough      HPI   Samuel Patricia is a 54 y.o. male who presents with cough, interrupting sleep        Medical History:  Past Medical History:   Diagnosis Date   • Hx of gout        Surgical History:  Past Surgical History:   Procedure Laterality Date   • WISDOM TOOTH EXTRACTION         Social History:  Social History     Social History Narrative   • Not on file       Family History:  Family History   Problem Relation Age of Onset   • Stroke Biological Mother    • Stroke Biological Father    • Diabetes Biological Father    • Diabetes Biological Brother    • Diabetes Biological Brother        Allergies:  Acetaminophen; Aspirin; Ibuprofen; and Penicillins    Current Medications:  Current Outpatient Medications   Medication Sig Dispense Refill   • atorvastatin (LIPITOR) 10 mg tablet Take 1 tablet (10 mg total) by mouth once daily. 90 tablet 3   • blood-glucose meter (CONTOUR NEXT METER) misc test up to once daily in am before breakfast only.     • CONTOUR NEXT TEST STRIPS strip TEST BLOOD SUGAR ONCE DAILY 100 strip 1   • fexofenadine (ALLEGRA ALLERGY) 180 mg tablet 180 mg.     • losartan (COZAAR) 25 mg tablet TAKE 1 TABLET BY MOUTH  DAILY 90 tablet 0   • metFORMIN XR (GLUCOPHAGE-XR) 500 mg 24 hr tablet Take 2 tablets (1,000 mg total) by mouth 2 (two) times a day with meals. 360 tablet 3   • azelastine 0.15 % (205.5 mcg) nasal spray Administer 1 spray into each nostril 2 (two) times a day as needed for rhinitis. 30 mL 1   • benzonatate (TESSALON) 100 mg capsule Take 1 to 2 capsules every 8 hours as needed for cough 30 capsule 0     No current facility-administered medications for this visit.        Review of Systems:  Review of Systems   Constitutional: Positive for fatigue  (r/t not sleeping well). Negative for chills and fever.   Respiratory: Positive for cough (non-productive). Negative for shortness of breath and wheezing.    Cardiovascular: Negative for chest pain.   Gastrointestinal: Negative for abdominal pain, constipation, diarrhea, nausea and vomiting.   Neurological: Positive for light-headedness (occasionally).       Objective     Vital Signs:  Vitals:    12/05/19 1216   BP: 128/72   Pulse: (!) 104   Resp: 18   Temp: 37.5 °C (99.5 °F)   SpO2: 97%       BMI:  Body mass index is 26.23 kg/m².     Physical Exam   Constitutional: He is oriented to person, place, and time. He appears well-developed and well-nourished.   HENT:   Head: Normocephalic and atraumatic.   Right Ear: Hearing normal. Tympanic membrane is bulging.   Left Ear: Hearing normal. A middle ear effusion is present.   Nose: Mucosal edema present. Right sinus exhibits no maxillary sinus tenderness and no frontal sinus tenderness. Left sinus exhibits no maxillary sinus tenderness and no frontal sinus tenderness.   Mouth/Throat: Uvula is midline.   PND present   Cardiovascular: Regular rhythm and normal heart sounds. Tachycardia present.   HR 100s   Pulmonary/Chest: Effort normal and breath sounds normal.   Neurological: He is alert and oriented to person, place, and time.   Psychiatric: He has a normal mood and affect. His speech is normal and behavior is normal. Judgment and thought content normal. Cognition and memory are normal.       Recent labs before today:     Lab Results   Component Value Date    WBC 6.7 10/23/2017    HGB 17.1 10/23/2017    HCT 50.5 10/23/2017     10/23/2017    CHOL 119 04/19/2019    TRIG 70 04/19/2019    HDL 48 04/19/2019    ALT 23 04/19/2019    AST 34 04/19/2019     04/19/2019    K 4.3 04/19/2019     04/19/2019    CREATININE 0.89 04/19/2019    BUN 13 04/19/2019    CO2 21 04/19/2019    TSH 2.450 10/23/2017    PSA 0.5 04/19/2019    HGBA1C 5.9 (H) 11/27/2019    MICROALBUR  34.2 04/19/2019        Procedures   Assessment     Patient Instructions   Swollen nasal passages can cause postnasal drip and cough.  Azelastine for nasal drainage, Tessalon for cough.  Drink warm beverages such as tea to dissolve secretions already in your throat, avoid milk products, and maintain hydration.        Problem List Items Addressed This Visit     None      Visit Diagnoses     Rhinosinusitis    -  Primary                    SAMUEL Macias  12/5/2019

## 2019-12-05 NOTE — PATIENT INSTRUCTIONS
Swollen nasal passages can cause postnasal drip and cough.  Azelastine for nasal drainage, Tessalon for cough.  Drink warm beverages such as tea to dissolve secretions already in your throat, avoid milk products, and maintain hydration.

## 2019-12-09 ENCOUNTER — OFFICE VISIT (OUTPATIENT)
Dept: FAMILY MEDICINE | Facility: CLINIC | Age: 54
End: 2019-12-09
Payer: COMMERCIAL

## 2019-12-09 VITALS
HEART RATE: 99 BPM | TEMPERATURE: 98.8 F | BODY MASS INDEX: 25.77 KG/M2 | SYSTOLIC BLOOD PRESSURE: 120 MMHG | HEIGHT: 69 IN | OXYGEN SATURATION: 98 % | DIASTOLIC BLOOD PRESSURE: 78 MMHG | WEIGHT: 174 LBS | RESPIRATION RATE: 18 BRPM

## 2019-12-09 DIAGNOSIS — Z12.9 SCREENING OF CANCER: ICD-10-CM

## 2019-12-09 DIAGNOSIS — E78.00 PURE HYPERCHOLESTEROLEMIA: ICD-10-CM

## 2019-12-09 DIAGNOSIS — E11.9 CONTROLLED TYPE 2 DIABETES MELLITUS WITHOUT COMPLICATION, WITHOUT LONG-TERM CURRENT USE OF INSULIN (CMS/HCC): ICD-10-CM

## 2019-12-09 DIAGNOSIS — I10 ESSENTIAL HYPERTENSION: Primary | ICD-10-CM

## 2019-12-09 DIAGNOSIS — Z23 NEED FOR INFLUENZA VACCINATION: ICD-10-CM

## 2019-12-09 PROCEDURE — 99213 OFFICE O/P EST LOW 20 MIN: CPT | Mod: 25 | Performed by: FAMILY MEDICINE

## 2019-12-09 PROCEDURE — 90471 IMMUNIZATION ADMIN: CPT | Performed by: FAMILY MEDICINE

## 2019-12-09 PROCEDURE — 90686 IIV4 VACC NO PRSV 0.5 ML IM: CPT | Performed by: FAMILY MEDICINE

## 2019-12-09 ASSESSMENT — ENCOUNTER SYMPTOMS
UNEXPECTED WEIGHT CHANGE: 0
DYSURIA: 0
VOMITING: 0
SHORTNESS OF BREATH: 0
FEVER: 0
FATIGUE: 0
CHEST TIGHTNESS: 0
NAUSEA: 0
HEADACHES: 0
PALPITATIONS: 0
ADENOPATHY: 0

## 2019-12-09 NOTE — PROGRESS NOTES
"Subjective  Patient returns for follow-up of hypertension, diabetes mellitus, hyperlipidemia.  He is doing well overall and denies new complaints or concerns.  He is on maximal metformin monotherapy.    He is proven to have had prior exposure to chickenpox thus is a candidate for Shingrix vaccine as discussed.     Patient ID: Samuel Patricia is a 54 y.o. male.  1965      HPI    The following have been reviewed and updated as appropriate in this visit:  Allergies  Meds  Problems       Review of Systems   Constitutional: Negative for fatigue, fever and unexpected weight change (NO UNEXPLAINED WEIGHT LOSS).   Respiratory: Negative for chest tightness and shortness of breath.    Cardiovascular: Negative for chest pain and palpitations.   Gastrointestinal: Negative for nausea and vomiting.   Genitourinary: Negative for dysuria.   Skin: Negative for rash.   Neurological: Negative for headaches.   Hematological: Negative for adenopathy.       Objective     Vitals:    12/09/19 0805   BP: 120/78   BP Location: Left upper arm   Patient Position: Sitting   Pulse: 99   Resp: 18   Temp: 37.1 °C (98.8 °F)   TempSrc: Oral   SpO2: 98%   Weight: 78.9 kg (174 lb)   Height: 1.753 m (5' 9.02\")     Body mass index is 25.68 kg/m².    Physical Exam   Constitutional: He appears well-developed and well-nourished. He is cooperative. No distress.   HENT:   Head: Normocephalic and atraumatic.   Right Ear: External ear normal.   Left Ear: External ear normal.   Nose: Nose normal.   Mouth/Throat: Oropharynx is clear and moist.   Otic Canals clear, Tympanic membranes intact  Eyes: RAOUL, EOMI, no conjunctival injection  Nose and throat clear, no inflammation or exudate, no oral mucosal lesions  Neck supple, with intact range of motion, no mass, no lymphadenopathy, no thyromegaly, no thyroid nodule, no carotid bruit, no JVD.   Eyes: Pupils are equal, round, and reactive to light. Conjunctivae, EOM and lids are normal. Right eye exhibits " no chemosis. Left eye exhibits no chemosis. Right conjunctiva is not injected. Left conjunctiva is not injected.   Fundoscopic exam:       The right eye shows no exudate and no papilledema.        The left eye shows no exudate and no papilledema.   Neck: Full passive range of motion without pain. No JVD present. Carotid bruit is not present. No thyroid mass and no thyromegaly present.   Cardiovascular: Normal rate, regular rhythm, normal heart sounds and intact distal pulses. PMI is not displaced. Exam reveals no gallop and no friction rub.   No murmur heard.  Pulses:       Dorsalis pedis pulses are 2+ on the right side, and 2+ on the left side.        Posterior tibial pulses are 2+ on the right side, and 2+ on the left side.   Pulmonary/Chest: Effort normal and breath sounds normal. No respiratory distress. He has no wheezes. He has no rales. He exhibits no tenderness.   Abdominal: Soft. Bowel sounds are normal. There is no tenderness.   Musculoskeletal: He exhibits no edema.        Right foot: There is no deformity.        Left foot: There is no deformity.   Back no CVA tenderness    Legs: no edema, no jessica venous insufficiency changes   Feet:   Right Foot:   Protective Sensation: 5 sites tested. 5 sites sensed.   Skin Integrity: Negative for ulcer, blister, skin breakdown, erythema or warmth.   Left Foot:   Protective Sensation: 5 sites tested. 5 sites sensed.   Skin Integrity: Negative for ulcer, blister, skin breakdown, erythema or warmth.   Lymphadenopathy:        Head (right side): No tonsillar adenopathy present.        Head (left side): No tonsillar adenopathy present.     He has no cervical adenopathy.     He has no axillary adenopathy.   Neurological: He is alert. He has normal reflexes. No cranial nerve deficit or sensory deficit.   Skin: Skin is warm and dry. No lesion and no rash noted.   Psychiatric: He has a normal mood and affect. His speech is normal and behavior is normal. Cognition and memory  are normal.       Assessment/Plan   Diagnoses and all orders for this visit:    Essential hypertension (Primary)  Assessment & Plan:  Well-controlled: Continue current Rx, follow-up 6 months      Need for influenza vaccination  Assessment & Plan:  Flu shot administered.      Controlled type 2 diabetes mellitus without complication, without long-term current use of insulin (CMS/[facility])  Assessment & Plan:  Stable, A1c acceptable at 5.9.  Continue maximal metformin, improve diet.    Orders:  -     Comprehensive metabolic panel; Future  -     Urinalysis with Reflex Culture (ED and Outpatient only); Future  -     Microalbumin/Creatinine Ur Random; Future  -     Hemoglobin A1c; Future    Pure hypercholesterolemia  Assessment & Plan:  Stable on a atorvastatin.  Continue same.    Orders:  -     Lipid panel; Future    Screening of cancer  Assessment & Plan:  Annual PSA is due in April.  Comprehensive labs ordered.    Orders:  -     PSA; Future    Other orders  -     Influenza vaccine quadrivalent preservative free 6 mon and older IM (FluLaval)

## 2019-12-09 NOTE — PATIENT INSTRUCTIONS
Essential hypertension  Well-controlled: Continue current Rx, follow-up 6 months    Need for influenza vaccination  Flu shot administered.    Controlled type 2 diabetes mellitus without complication, without long-term current use of insulin (CMS/HCC)  Stable, A1c acceptable at 5.9.  Continue maximal metformin, improve diet.    Pure hypercholesterolemia  Stable on a atorvastatin.  Continue same.    Screening of cancer  Annual PSA is due in April.  Comprehensive labs ordered.

## 2019-12-11 ENCOUNTER — TELEPHONE (OUTPATIENT)
Dept: FAMILY MEDICINE | Facility: CLINIC | Age: 54
End: 2019-12-11

## 2019-12-11 NOTE — TELEPHONE ENCOUNTER
"----- Message from Ml Cho MA sent at 12/2/2019  8:47 AM EST -----      ----- Message -----  From: Ash Nam MD  Sent: 11/30/2019  11:48 AM EST  To: Ml Cho MA    Notify pt he has never been exposed to chickenpox so has no risk for shingles and does NOT need shingles vaccine.  He CAN catch chickenpox if exposed to someone with either chickenpox or shingles so could consider, if he wishes to have protection from same, a Varicella vaccination: I am not sure on insurance coverage for adults so forwarding this call to go thru Ml Cho who may be knowledgable in this regard?    The other labs show good A1C at 5.9 relecting good diabetes control ( slightly into the\" prediabetic range\")  "

## 2019-12-12 ENCOUNTER — TELEPHONE (OUTPATIENT)
Dept: FAMILY MEDICINE | Facility: CLINIC | Age: 54
End: 2019-12-12

## 2019-12-12 NOTE — TELEPHONE ENCOUNTER
I  Reviewed his November labs again after a message from pt's wife and see that the Varicella titer was incorrectly reported ( by me, to nurse, to patient) as negative when in reality it was positive and consistent with prior chickenpox exposure and thus supported his proceeding with Shingrix vaccine series.      Placing this note in the chart as a correction and will see if there is a way to edit my original interpretation note, with apologies relayed by phone message to the patient and his wife.

## 2019-12-12 NOTE — TELEPHONE ENCOUNTER
Pts wife called stating that for his Varicella titer results he was exposed to the chickenpox, but in his results notes it says that he was not.  Is there a way to change the results notes?  She also states he got his Shingles vaccine and I recorded it in his Immunizations.      Reply from Dr Nam:    She is correct about the reporting erroron my part: see my phone encounter dated today summarizing same.  I have tried but so far not succeeded in finding a way to correct  Or edit the lab review note but will attempt to do so if possible. I left a detailed message ( and apology for my oversight in review).  He has been correctly informed to proceed with shingrix vaccine series as he does have risk for Shingles based on the Varicella IgG of 599.

## 2020-03-31 ENCOUNTER — TELEMEDICINE (OUTPATIENT)
Dept: FAMILY MEDICINE | Facility: CLINIC | Age: 55
End: 2020-03-31
Payer: COMMERCIAL

## 2020-03-31 ENCOUNTER — TELEPHONE (OUTPATIENT)
Dept: FAMILY MEDICINE | Facility: CLINIC | Age: 55
End: 2020-03-31

## 2020-03-31 DIAGNOSIS — M10.072 ACUTE IDIOPATHIC GOUT INVOLVING TOE OF LEFT FOOT: Primary | ICD-10-CM

## 2020-03-31 PROCEDURE — 99212 OFFICE O/P EST SF 10 MIN: CPT | Mod: 95 | Performed by: FAMILY MEDICINE

## 2020-03-31 RX ORDER — INDOMETHACIN 25 MG/1
CAPSULE ORAL
Qty: 30 CAPSULE | Refills: 1 | Status: SHIPPED | OUTPATIENT
Start: 2020-03-31 | End: 2021-07-15

## 2020-03-31 NOTE — PROGRESS NOTES
Request for Consent:  You and I are about to have a telemedicine check-in or visit. This is allowed because you are already my patient, and you have requested it.  This telemedicine visit will be billed to your health insurance or you, if you are self-insured.  You understand you will be responsible for any copayments or coinsurances that apply to your telemedicine visit.  Before starting our telemedicine visit, I am required to get your consent for this virtual check-in or visit by telemedicine. Do you consent?      Patient Response to Request for Consent: YES    The following have been reviewed and updated as appropriate in this visit:         Visit Documentation:  Pt awoke with his first gout attack for years: last treated in 2016,: left 1st MTP joint is in early stage of acute flare, very familiar to pt.  last treated with Colchicine,  Previously also has used Indomethacin and steroid..     Plan:    Rx Indomethacin as directed, reviewed low purine diet, followup prn         Time Spent in Medical Discussion During This Encounter:  8 minutes

## 2020-06-08 ENCOUNTER — TELEPHONE (OUTPATIENT)
Dept: FAMILY MEDICINE | Facility: CLINIC | Age: 55
End: 2020-06-08

## 2020-06-08 NOTE — TELEPHONE ENCOUNTER
"Call patient: don't bother with AB test.    Not worth testing patients who did not get severely ill, and 100 percent of the patients  That \"hoped they were done with it\" have tested negative.   "

## 2020-06-08 NOTE — TELEPHONE ENCOUNTER
----- Message from Samuel Patricia sent at 6/8/2020  7:50 AM EDT -----  Regarding: Prescription Question  Contact: [phone]  Will Balderas Here,  I am getting my six month bloodwork on Wednesday and noticed that [facility] has antibody blood testing for Covid.  I was wondering if I should have that done?  If so can you send them a prescription?  I use the [facility] on [address], right up the road from your office.  Also I have switched Health Care plans as my wife got a job and her plan is better.    Anyway, you your well and safe.  Let me know.  Thanks

## 2020-06-11 LAB
ALBUMIN SERPL-MCNC: 4.9 G/DL (ref 3.8–4.9)
ALBUMIN/CREAT UR: 17 MG/G CREAT (ref 0–29)
ALBUMIN/GLOB SERPL: 1.8 {RATIO} (ref 1.2–2.2)
ALP SERPL-CCNC: 54 IU/L (ref 39–117)
ALT SERPL-CCNC: 22 IU/L (ref 0–44)
APPEARANCE UR: CLEAR
AST SERPL-CCNC: 28 IU/L (ref 0–40)
BACTERIA #/AREA URNS HPF: NORMAL /[HPF]
BILIRUB SERPL-MCNC: 0.3 MG/DL (ref 0–1.2)
BILIRUB UR QL STRIP: NEGATIVE
BUN SERPL-MCNC: 11 MG/DL (ref 6–24)
BUN/CREAT SERPL: 13 (ref 9–20)
CALCIUM SERPL-MCNC: 9.6 MG/DL (ref 8.7–10.2)
CHLORIDE SERPL-SCNC: 105 MMOL/L (ref 96–106)
CHOLEST SERPL-MCNC: 161 MG/DL (ref 100–199)
CO2 SERPL-SCNC: 19 MMOL/L (ref 20–29)
COLOR UR: YELLOW
CREAT SERPL-MCNC: 0.85 MG/DL (ref 0.76–1.27)
CREAT UR-MCNC: 139.7 MG/DL
EPI CELLS #/AREA URNS HPF: NORMAL /HPF (ref 0–10)
GLOBULIN SER CALC-MCNC: 2.7 G/DL (ref 1.5–4.5)
GLUCOSE SERPL-MCNC: 104 MG/DL (ref 65–99)
GLUCOSE UR QL: NEGATIVE
HBA1C MFR BLD: 5.7 % (ref 4.8–5.6)
HDLC SERPL-MCNC: 64 MG/DL
HGB UR QL STRIP: NEGATIVE
KETONES UR QL STRIP: NEGATIVE
LAB CORP EGFR IF AFRICN AM: 115 ML/MIN/1.73
LAB CORP EGFR IF NONAFRICN AM: 99 ML/MIN/1.73
LAB CORP URINALYSIS REFLEX: NORMAL
LDLC SERPL CALC-MCNC: 79 MG/DL (ref 0–99)
LEUKOCYTE ESTERASE UR QL STRIP: NEGATIVE
MICRO URNS: NORMAL
MICRO URNS: NORMAL
MICROALBUMIN UR-MCNC: 23.4 UG/ML
MUCOUS THREADS URNS QL MICRO: PRESENT
NITRITE UR QL STRIP: NEGATIVE
PH UR STRIP: 5 [PH] (ref 5–7.5)
POTASSIUM SERPL-SCNC: 4.7 MMOL/L (ref 3.5–5.2)
PROT SERPL-MCNC: 7.6 G/DL (ref 6–8.5)
PROT UR QL STRIP: NEGATIVE
PSA SERPL-MCNC: 0.7 NG/ML (ref 0–4)
RBC #/AREA URNS HPF: NORMAL /HPF (ref 0–2)
SODIUM SERPL-SCNC: 144 MMOL/L (ref 134–144)
SP GR UR: 1.02 (ref 1–1.03)
TRIGL SERPL-MCNC: 88 MG/DL (ref 0–149)
UROBILINOGEN UR STRIP-MCNC: 0.2 MG/DL (ref 0.2–1)
VLDLC SERPL CALC-MCNC: 18 MG/DL (ref 5–40)
WBC #/AREA URNS HPF: NORMAL /HPF (ref 0–5)

## 2020-06-16 ENCOUNTER — TELEPHONE (OUTPATIENT)
Dept: FAMILY MEDICINE | Facility: CLINIC | Age: 55
End: 2020-06-16

## 2020-06-17 NOTE — TELEPHONE ENCOUNTER
Patient returned your call. Please call him when you are back in the office to discuss his health maintenance. He would like you to use his cell [phone]

## 2020-06-18 ENCOUNTER — TELEMEDICINE (OUTPATIENT)
Dept: FAMILY MEDICINE | Facility: CLINIC | Age: 55
End: 2020-06-18
Payer: COMMERCIAL

## 2020-06-18 DIAGNOSIS — N39.0 URINARY TRACT INFECTION WITHOUT HEMATURIA, SITE UNSPECIFIED: Primary | ICD-10-CM

## 2020-06-18 PROCEDURE — 99442 PR PHYS/QHP TELEPHONE EVALUATION 11-20 MIN: CPT | Performed by: NURSE PRACTITIONER

## 2020-06-18 RX ORDER — CIPROFLOXACIN 500 MG/1
500 TABLET ORAL 2 TIMES DAILY
Qty: 10 TABLET | Refills: 0 | Status: SHIPPED | OUTPATIENT
Start: 2020-06-18 | End: 2020-06-23

## 2020-06-18 ASSESSMENT — ENCOUNTER SYMPTOMS
FACIAL ASYMMETRY: 0
MUSCULOSKELETAL NEGATIVE: 1
ALLERGIC/IMMUNOLOGIC NEGATIVE: 1
ABDOMINAL PAIN: 1
DIARRHEA: 0
EYES NEGATIVE: 1
FREQUENCY: 1
VOMITING: 0
DIZZINESS: 0
DIFFICULTY URINATING: 0
PSYCHIATRIC NEGATIVE: 1
CONSTIPATION: 0
LIGHT-HEADEDNESS: 0
HEMATOLOGIC/LYMPHATIC NEGATIVE: 1
FEVER: 0
APPETITE CHANGE: 0
CHILLS: 0
ENDOCRINE NEGATIVE: 1
SHORTNESS OF BREATH: 0
DYSURIA: 1
COUGH: 0
HEMATURIA: 0
FLANK PAIN: 0

## 2020-06-18 NOTE — PROGRESS NOTES
[facility]  [address] [place] [address]  [phone]       Verification of Patient Location:  The patient affirms they are currently located in the following state: [place]    Request for Consent:   Audio Only Encounter   You and I are about to have a telemedicine check-in or visit. This is allowed because you have requested it. This telemedicine visit will be billed to your health insurance or you, if you are self-insured. You understand you will be responsible for any copayments or coinsurances that apply to your telemedicine visit. Before starting our telemedicine visit, I am required to get your consent for this virtual check-in or visit by telemedicine. Do you consent?          Patient Response to Request for Consent:  Yes        Reason for visit:   Chief Complaint   Patient presents with   • UTI     since last night.       HPI   Samuel Patricia is a 54 y.o. male who presents with this AM frequency and dysuria.patient over the course of the night was having a hard time sleeping then woke up this am with a pressure in subrapubic area towards the right. Patient took a at home uti kit that wife had because she also has a uti and his came back postive for UTI. He feels he constantly has to go but when goes it is a small amount. Denies fever, chills, nasuea, flank pain . Dribbling, hesitancy, pain at the tip of the penis and pelvic/ perineal pain.         Medical History:  Past Medical History:   Diagnosis Date   • Hx of gout        Surgical History:  Past Surgical History:   Procedure Laterality Date   • WISDOM TOOTH EXTRACTION         Social History:  Social History     Social History Narrative   • Not on file       Family History:  Family History   Problem Relation Age of Onset   • Stroke Biological Mother    • Stroke Biological Father    • Diabetes Biological Father    • Diabetes Biological Brother    • Diabetes Biological Brother        Allergies:  Acetaminophen;  Aspirin; Ibuprofen; and Penicillins    Current Medications:  Current Outpatient Medications   Medication Sig Dispense Refill   • atorvastatin (LIPITOR) 10 mg tablet TAKE 1 TABLET BY MOUTH ONCE DAILY 90 tablet 3   • benzonatate (TESSALON) 100 mg capsule Take 1 to 2 capsules every 8 hours as needed for cough 30 capsule 0   • blood-glucose meter (CONTOUR NEXT METER) misc test up to once daily in am before breakfast only.     • ciprofloxacin (CIPRO) 500 mg tablet Take 1 tablet (500 mg total) by mouth 2 (two) times a day for 5 days. 10 tablet 0   • CONTOUR NEXT TEST STRIPS strip TEST BLOOD SUGAR ONCE DAILY 100 strip 1   • fexofenadine (ALLEGRA ALLERGY) 180 mg tablet 180 mg.     • indomethacin (INDOCIN) 25 mg capsule For acute gou: take 2 caps every 6 hours x 4 doses, then one 3 times a day for 2 days.  Take with food. 30 capsule 1   • losartan (COZAAR) 25 mg tablet TAKE 1 TABLET BY MOUTH  DAILY 90 tablet 3   • metFORMIN XR (GLUCOPHAGE-XR) 500 mg 24 hr tablet TAKE 2 TABLETS BY MOUTH TWO TIMES DAILY WITH MEALS 360 tablet 3     No current facility-administered medications for this visit.        Review of Systems:  Review of Systems   Constitutional: Negative for appetite change, chills and fever.   HENT: Negative.    Eyes: Negative.    Respiratory: Negative for cough and shortness of breath.    Cardiovascular: Negative for chest pain.   Gastrointestinal: Positive for abdominal pain (RLQ). Negative for constipation, diarrhea and vomiting.   Endocrine: Negative.    Genitourinary: Positive for decreased urine volume, dysuria and frequency. Negative for difficulty urinating, flank pain, hematuria, penile pain and urgency.        Subrapubic tenderness    Musculoskeletal: Negative.    Skin: Negative.    Allergic/Immunologic: Negative.    Neurological: Negative for dizziness, facial asymmetry and light-headedness.   Hematological: Negative.    Psychiatric/Behavioral: Negative.        Objective     Vital Signs:  There were no  vitals filed for this visit.    BMI:  There is no height or weight on file to calculate BMI.     Physical Exam    Recent labs before today:     Lab Results   Component Value Date    WBC 6.7 10/23/2017    HGB 17.1 10/23/2017    HCT 50.5 10/23/2017     10/23/2017    CHOL 161 06/10/2020    TRIG 88 06/10/2020    HDL 64 06/10/2020    ALT 22 06/10/2020    AST 28 06/10/2020     06/10/2020    K 4.7 06/10/2020     06/10/2020    CREATININE 0.85 06/10/2020    BUN 11 06/10/2020    CO2 19 (L) 06/10/2020    TSH 2.450 10/23/2017    PSA 0.7 06/10/2020    HGBA1C 5.7 (H) 06/10/2020    MICROALBUR 23.4 06/10/2020        Assessment/Plan   Problem List Items Addressed This Visit     None      Visit Diagnoses     Urinary tract infection without hematuria, site unspecified    -  Primary    Relevant Medications    ciprofloxacin (CIPRO) 500 mg tablet  Patient educated on black box warning   · Patient denies dribbling, hesitancy, pain at tip of penis, fever or chills so I dont suspect prostatis  · Patient denies fever, chills, flank pain and nausea- dont suspect kidney involement  · Had patient perform rovsing sign on self and he didn't feel any pain in the RLQ- I dont suspect appendiitis but not def without imagining   This patient's symptoms of urgency, frequency and dysuria our noted with UTI. Patient hasn't had a uti for years.     UTI plan  Ordered cipro 500mg BID for 5 days.  Patient educated on importance of taking full course of antibiotic  and that symptoms should improve in 24-48 hours  Patient encouraged to increase fluid intake   Told patient that she can use OTC AZO for dysuria and that SE is the urine will turn orange.   Patient told if symptoms don't improve in next 24-48 hours to give the office a call  Worsening UTI symptoms  discussed with patient such as new flank pain, fever, chill, vomiting and nasuea     We talked about I would like to order urine culture and urinaysis however, patient states having to  go to lab copr took 5 days to just get an appointment. So we agree to start empirical treatment for UTI. However made patient aware I would want one if symptoms dont improve but it could give altered result due to being on antibotic.   Patient verbalize understanding of instructions  ·           Procedures     Due to the national medical emergency, this visit was conducted via telephone/ video.     Because of the COVID-19 pandemic, in order to limit patient contact and promote the safety of patients and the healthcare team, I did not physically examine the patient.   There are no Patient Instructions on file for this visit.           Time Spent in Medical Discussion During This Encounter:  12 minutes  SAMUEL Wallace    6/18/2020

## 2020-07-02 ENCOUNTER — TELEPHONE (OUTPATIENT)
Dept: FAMILY MEDICINE | Facility: CLINIC | Age: 55
End: 2020-07-02

## 2020-07-07 ENCOUNTER — DOCUMENTATION (OUTPATIENT)
Dept: FAMILY MEDICINE | Facility: CLINIC | Age: 55
End: 2020-07-07

## 2020-07-07 NOTE — PROGRESS NOTES
Radha Pink MA has completed a chart review for Samuel DIMITRIS Patricia     Care Gap Source:: [facility]    Care Gap(s) Identified:: Diabetic Eye Exam    Chart Review Completed:: Yes     Per the [facility] Care Gap Report, patient is due for a diabetic eye exam. Patient has an upcoming appt scheduled for 7/9/20. I edited the appt notes to reflect this information.

## 2020-07-09 ENCOUNTER — OFFICE VISIT (OUTPATIENT)
Dept: FAMILY MEDICINE | Facility: CLINIC | Age: 55
End: 2020-07-09
Payer: COMMERCIAL

## 2020-07-09 VITALS
WEIGHT: 171 LBS | HEART RATE: 75 BPM | OXYGEN SATURATION: 97 % | TEMPERATURE: 98 F | BODY MASS INDEX: 25.33 KG/M2 | SYSTOLIC BLOOD PRESSURE: 122 MMHG | HEIGHT: 69 IN | RESPIRATION RATE: 18 BRPM | DIASTOLIC BLOOD PRESSURE: 80 MMHG

## 2020-07-09 DIAGNOSIS — M1A.00X0 IDIOPATHIC CHRONIC GOUT WITHOUT TOPHUS, UNSPECIFIED SITE: ICD-10-CM

## 2020-07-09 DIAGNOSIS — I10 ESSENTIAL HYPERTENSION: ICD-10-CM

## 2020-07-09 DIAGNOSIS — M53.3 SACROILIAC JOINT DYSFUNCTION OF RIGHT SIDE: ICD-10-CM

## 2020-07-09 DIAGNOSIS — Z12.9 SCREENING OF CANCER: Primary | ICD-10-CM

## 2020-07-09 DIAGNOSIS — E11.9 CONTROLLED TYPE 2 DIABETES MELLITUS WITHOUT COMPLICATION, WITHOUT LONG-TERM CURRENT USE OF INSULIN (CMS/HCC): ICD-10-CM

## 2020-07-09 DIAGNOSIS — Z00.00 ROUTINE MEDICAL EXAM: ICD-10-CM

## 2020-07-09 DIAGNOSIS — E78.00 PURE HYPERCHOLESTEROLEMIA: ICD-10-CM

## 2020-07-09 PROBLEM — M10.072 ACUTE IDIOPATHIC GOUT INVOLVING TOE OF LEFT FOOT: Status: ACTIVE | Noted: 2020-07-09

## 2020-07-09 PROCEDURE — 99212 OFFICE O/P EST SF 10 MIN: CPT | Mod: 25 | Performed by: FAMILY MEDICINE

## 2020-07-09 PROCEDURE — 99396 PREV VISIT EST AGE 40-64: CPT | Performed by: FAMILY MEDICINE

## 2020-07-09 RX ORDER — ATORVASTATIN CALCIUM 10 MG/1
10 TABLET, FILM COATED ORAL
Qty: 90 TABLET | Refills: 3 | Status: SHIPPED | OUTPATIENT
Start: 2020-07-09 | End: 2020-09-22 | Stop reason: SDUPTHER

## 2020-07-09 ASSESSMENT — ENCOUNTER SYMPTOMS
FLANK PAIN: 0
FATIGUE: 0
CHEST TIGHTNESS: 0
DIAPHORESIS: 0
SORE THROAT: 0
CONSTIPATION: 0
NAUSEA: 0
VOMITING: 0
DIARRHEA: 0
WEAKNESS: 0
HEADACHES: 0
ARTHRALGIAS: 0
FEVER: 0
ADENOPATHY: 0
PALPITATIONS: 0
ABDOMINAL PAIN: 0
APPETITE CHANGE: 0
MYALGIAS: 0
BLOOD IN STOOL: 0
ACTIVITY CHANGE: 0
EYE REDNESS: 0
EYE PAIN: 0
SHORTNESS OF BREATH: 0
DIZZINESS: 0
COUGH: 0
UNEXPECTED WEIGHT CHANGE: 0
DYSURIA: 0

## 2020-07-09 NOTE — ASSESSMENT & PLAN NOTE
Discuss wellness exercise diet.  I recommend that he have calcium scoring:  Get cardiac CT for Ca score as ordered:  this will help us not only to assess overall long term cardiac risk in concrete terms, but also help to establish a firm LDL target for this patient, and discern the potential benefit of aspirin prophylaxis.  We discussed it in detail, and the patient understands the rationale for testing, benefits of testing, and agrees to proceed.

## 2020-07-09 NOTE — PROGRESS NOTES
"Subjective   For CPE and Diabetes/HTN/hyperlipidemia/gout followup.  -123.  A1C 5.7.    Home Bps run 118-123/ 80-81    Had gout treated by telemed this year responded to 2 doses  Of Indomethacin. n    LDL a few points above target.    Last colonoscopy was 3 years ago.     Has mild induration left deltoid after bee sting but no itching or tenderness.     Has annual eye checkup ( I asked him to send documentation from 9/ 2019) and no retinopathy,     Patient ID: Samuel Patricia is a 54 y.o. male.  1965      HPI    The following have been reviewed and updated as appropriate in this visit:  Allergies  Meds  Problems       Review of Systems   Constitutional: Negative for activity change, appetite change, diaphoresis, fatigue, fever and unexpected weight change.   HENT: Negative for congestion and sore throat.    Eyes: Negative for pain, redness and visual disturbance.   Respiratory: Negative for cough, chest tightness and shortness of breath.    Cardiovascular: Negative for chest pain and palpitations.   Gastrointestinal: Negative for abdominal pain, blood in stool, constipation, diarrhea, nausea and vomiting.   Genitourinary: Negative for dysuria and flank pain.   Musculoskeletal: Negative for arthralgias and myalgias.   Skin: Negative for rash.   Neurological: Negative for dizziness, weakness and headaches.   Hematological: Negative for adenopathy.   Psychiatric/Behavioral: Negative for behavioral problems.       Objective     Vitals:    07/09/20 0848   BP: 122/80   BP Location: Right upper arm   Patient Position: Sitting   Pulse: 75   Resp: 18   Temp: 36.7 °C (98 °F)   TempSrc: Oral   SpO2: 97%   Weight: 77.6 kg (171 lb)   Height: 1.753 m (5' 9.02\")     Body mass index is 25.24 kg/m².    Physical Exam   Constitutional: He appears well-developed and well-nourished. He is cooperative. No distress.   HENT:   Head: Normocephalic and atraumatic.   Right Ear: External ear normal.   Left Ear: External ear " normal.   Nose: Nose normal.   Mouth/Throat: Oropharynx is clear and moist.   Eyes: Pupils are equal, round, and reactive to light. Conjunctivae, EOM and lids are normal. Right eye exhibits no chemosis. Left eye exhibits no chemosis. Right conjunctiva is not injected. Left conjunctiva is not injected.   Fundoscopic exam:       The right eye shows no exudate and no papilledema.        The left eye shows no exudate and no papilledema.   Neck: Normal range of motion and full passive range of motion without pain. Neck supple. No JVD present. Carotid bruit is not present. No thyroid mass and no thyromegaly present.   Cardiovascular: Normal rate, regular rhythm, normal heart sounds and intact distal pulses. PMI is not displaced. Exam reveals no gallop and no friction rub.   No murmur heard.  Pulses:       Dorsalis pedis pulses are 2+ on the right side, and 2+ on the left side.        Posterior tibial pulses are 2+ on the right side, and 2+ on the left side.   Pulmonary/Chest: Effort normal and breath sounds normal. No respiratory distress. He has no wheezes. He has no rales. He exhibits no tenderness.   Abdominal: Soft. Bowel sounds are normal. He exhibits abdominal bruit. He exhibits no distension, no ascites and no mass. There is no hepatosplenomegaly or splenomegaly. There is no tenderness. There is no rebound and no guarding. No hernia.   Genitourinary: Rectum normal and testes normal. Rectal exam shows no mass, no tenderness and guaiac negative stool. Enlarged: Prostate smooth, nop nodules, no tenderness.   Genitourinary Comments: Prostate minimally enlarged at 1+ size  No nodules.  ( Recent PSA normal)   Musculoskeletal: Normal range of motion.        Right foot: There is no deformity.        Left foot: There is no deformity.   Feet:   Right Foot:   Protective Sensation: 5 sites tested. 5 sites sensed.   Skin Integrity: Negative for ulcer, blister, skin breakdown, erythema or warmth.   Left Foot:   Protective  Sensation: 5 sites tested. 5 sites sensed.   Skin Integrity: Negative for ulcer, blister, skin breakdown, erythema or warmth.   Lymphadenopathy:        Head (right side): No submandibular, no tonsillar, no preauricular, no posterior auricular and no occipital adenopathy present.        Head (left side): No submandibular, no tonsillar, no preauricular, no posterior auricular and no occipital adenopathy present.     He has no cervical adenopathy.     He has no axillary adenopathy.        Right: No inguinal and no supraclavicular adenopathy present.        Left: No inguinal and no supraclavicular adenopathy present.   Neurological: He is alert. He has normal strength and normal reflexes. No cranial nerve deficit or sensory deficit. He displays a negative Romberg sign.   Cranial nerves 2-12 intact, normal gait,  normal Cerebellar (finger to nose)   Skin: Skin is warm and dry. Lesion (No suspicious lesions present) noted. No rash noted.   Psychiatric: He has a normal mood and affect. His speech is normal and behavior is normal. Cognition and memory are normal.       Assessment/Plan   Diagnoses and all orders for this visit:    Screening of cancer (Primary)    Routine medical exam  Assessment & Plan:  Discuss wellness exercise diet.  I recommend that he have calcium scoring:  Get cardiac CT for Ca score as ordered:  this will help us not only to assess overall long term cardiac risk in concrete terms, but also help to establish a firm LDL target for this patient, and discern the potential benefit of aspirin prophylaxis.  We discussed it in detail, and the patient understands the rationale for testing, benefits of testing, and agrees to proceed.      Essential hypertension    Pure hypercholesterolemia  -     CT HEART CORONARY CALCIUM SCORE; Future    Sacroiliac joint dysfunction of right side  Assessment & Plan:  Stable, occasionally symptomatic, under care of chiropractor      Controlled type 2 diabetes mellitus without  complication, without long-term current use of insulin (CMS/[facility])  Assessment & Plan:  Excellent control A1c at target, he has had annual dilated retinal exams every September and confirmed that he had same in September 2019.  He is allergic to aspirin thus not on it.  He takes losartan with good blood pressure control and renal protective effects of same.    Orders:  -     CT HEART CORONARY CALCIUM SCORE; Future    Idiopathic chronic gout without tophus, unspecified site    Other orders  -     atorvastatin (LIPITOR) 10 mg tablet; Take 1 tablet (10 mg total) by mouth once daily.

## 2020-07-09 NOTE — ASSESSMENT & PLAN NOTE
Excellent control A1c at target, he has had annual dilated retinal exams every September and confirmed that he had same in September 2019.  He is allergic to aspirin thus not on it.  He takes losartan with good blood pressure control and renal protective effects of same.

## 2020-07-17 ENCOUNTER — HOSPITAL ENCOUNTER (OUTPATIENT)
Dept: RADIOLOGY | Age: 55
Discharge: HOME | End: 2020-07-17
Attending: FAMILY MEDICINE

## 2020-07-17 DIAGNOSIS — E11.9 CONTROLLED TYPE 2 DIABETES MELLITUS WITHOUT COMPLICATION, WITHOUT LONG-TERM CURRENT USE OF INSULIN (CMS/HCC): ICD-10-CM

## 2020-07-17 DIAGNOSIS — E78.00 PURE HYPERCHOLESTEROLEMIA: ICD-10-CM

## 2020-07-17 PROCEDURE — 75571 CT HRT W/O DYE W/CA TEST: CPT

## 2020-07-23 ENCOUNTER — TELEPHONE (OUTPATIENT)
Dept: FAMILY MEDICINE | Facility: CLINIC | Age: 55
End: 2020-07-23

## 2020-07-23 NOTE — TELEPHONE ENCOUNTER
----- Message from Ash Nam MD sent at 7/23/2020  8:36 AM EDT -----  Notify patient of calcium score results: He has a few small specks of calcified plaque in the coronary arteries with calcium score of 6 points.  This turns out to be slightly above average for age, between the 50th and 75th percentile and for that reason we recommend pushing the LDL below 70 for long-term prevention.  The BRENDAN 1.6 cm groundglass appearance nodule in the left mid lung/lingula for which repeat chest CT dedicated to lung is recommended in 6 to 12 months then every 2 years x 2.  Patient should call us for lung CT orders next July.    Review of his most recent labs show LDL of 79 which is close to target: try to get the additional 9 points of lowering from diet!

## 2021-01-29 ENCOUNTER — OFFICE VISIT (OUTPATIENT)
Dept: FAMILY MEDICINE | Facility: CLINIC | Age: 56
End: 2021-01-29
Payer: COMMERCIAL

## 2021-01-29 VITALS
RESPIRATION RATE: 18 BRPM | HEART RATE: 91 BPM | HEIGHT: 69 IN | OXYGEN SATURATION: 98 % | BODY MASS INDEX: 27.85 KG/M2 | DIASTOLIC BLOOD PRESSURE: 74 MMHG | WEIGHT: 188 LBS | SYSTOLIC BLOOD PRESSURE: 116 MMHG | TEMPERATURE: 98 F

## 2021-01-29 DIAGNOSIS — E11.9 CONTROLLED TYPE 2 DIABETES MELLITUS WITHOUT COMPLICATION, WITHOUT LONG-TERM CURRENT USE OF INSULIN (CMS/HCC): ICD-10-CM

## 2021-01-29 DIAGNOSIS — Z12.9 SCREENING OF CANCER: ICD-10-CM

## 2021-01-29 DIAGNOSIS — E78.00 PURE HYPERCHOLESTEROLEMIA: ICD-10-CM

## 2021-01-29 DIAGNOSIS — M1A.00X0 IDIOPATHIC CHRONIC GOUT WITHOUT TOPHUS, UNSPECIFIED SITE: ICD-10-CM

## 2021-01-29 DIAGNOSIS — I10 ESSENTIAL HYPERTENSION: Primary | ICD-10-CM

## 2021-01-29 PROCEDURE — 99214 OFFICE O/P EST MOD 30 MIN: CPT | Performed by: FAMILY MEDICINE

## 2021-01-29 RX ORDER — ATORVASTATIN CALCIUM 10 MG/1
10 TABLET, FILM COATED ORAL
Qty: 90 TABLET | Refills: 1 | Status: SHIPPED | OUTPATIENT
Start: 2021-01-29 | End: 2021-08-23

## 2021-01-29 RX ORDER — BLOOD SUGAR DIAGNOSTIC
STRIP MISCELLANEOUS
Qty: 100 STRIP | Refills: 1 | Status: SHIPPED | OUTPATIENT
Start: 2021-01-29

## 2021-01-29 RX ORDER — METFORMIN HYDROCHLORIDE 500 MG/1
TABLET, EXTENDED RELEASE ORAL
Qty: 360 TABLET | Refills: 1 | Status: SHIPPED | OUTPATIENT
Start: 2021-01-29 | End: 2022-03-25

## 2021-01-29 RX ORDER — LOSARTAN POTASSIUM 25 MG/1
25 TABLET ORAL
Qty: 90 TABLET | Refills: 1 | Status: SHIPPED | OUTPATIENT
Start: 2021-01-29 | End: 2021-08-23

## 2021-01-29 ASSESSMENT — ENCOUNTER SYMPTOMS
ADENOPATHY: 0
DIARRHEA: 0
DIAPHORESIS: 0
CONSTIPATION: 0
HEMATURIA: 0
ARTHRALGIAS: 0
TREMORS: 0
FATIGUE: 0
FLANK PAIN: 0
TROUBLE SWALLOWING: 0
NUMBNESS: 0
EYE PAIN: 0
DIZZINESS: 0
CHEST TIGHTNESS: 0
JOINT SWELLING: 0
ENDOCRINE COMMENTS: DENIES SEVERE HYPOGLYCEMIA, OR SYMPTOMS OF  DIZZINESS, CONFUSION, SYNCOPE, TREMOR.
FEVER: 0
SINUS PRESSURE: 0
APPETITE CHANGE: 0
ABDOMINAL PAIN: 0
SHORTNESS OF BREATH: 0
VOMITING: 0
EYE REDNESS: 0
DIFFICULTY URINATING: 0
WOUND: 0
UNEXPECTED WEIGHT CHANGE: 0
PALPITATIONS: 0
HEADACHES: 0
NAUSEA: 0
POLYDIPSIA: 0
SORE THROAT: 0
COUGH: 0
DYSURIA: 0
BLOOD IN STOOL: 0
FREQUENCY: 0
SPEECH DIFFICULTY: 0
ACTIVITY CHANGE: 0
CHILLS: 0
WEAKNESS: 0

## 2021-01-29 NOTE — PATIENT INSTRUCTIONS
Essential hypertension  Well-controlled: Continue current Rx, labs in annual physical in June    Pure hypercholesterolemia  Stable, LDL a few points above target in June.  Reviewed diet, continue atorvastatin.    Controlled type 2 diabetes mellitus without complication, without long-term current use of insulin (CMS/[facility])  Stable, A1c is below 6.0 for years.Focus on diet and weight loss.    Acute idiopathic gout involving toe of left foot  Controlled by diet with no symptoms of gout for over 1 year.

## 2021-01-29 NOTE — PROGRESS NOTES
Subjective  Patient returns for annual complete physical exam and follow-up of hypertension diabetes hyperlipidemia gout.  Last labs from June 2020 show A1c 5.7 and in fact he has remained below 6.0 since January 2018.negative urine microalbumin lipids are up 22 points with LDL now 79 from 57 last year.  His target is less than 70 so we should try to get that back down via diet and meds: Reduce cheese butter egg yolks red meat.     Glucoses mildly elevated at 104 but better than last year, normal kidney and liver tests, PSAs normal at 0.7 up from 0.5 last year which is fine.  Urinalysis all clear hemoglobin A1c indicates excellent overall diabetes control at 5.7 which is just a decimal point above nondiabetic level!    He has not had gout for over a year    FBS has risen to 125-135 range.    Outpatient BP control remains good       Patient ID: Samuel Patricia is a 55 y.o. male.  1965      HPI    The following have been reviewed and updated as appropriate in this visit:       Review of Systems   Constitutional: Negative for activity change, appetite change, chills, diaphoresis, fatigue, fever and unexpected weight change (NO UNEXPLAINED WEIGHT LOSS).   HENT: Negative for ear pain, mouth sores, sinus pressure, sore throat and trouble swallowing.    Eyes: Negative for pain, redness and visual disturbance.   Respiratory: Negative for cough, chest tightness and shortness of breath.    Cardiovascular: Negative for chest pain and palpitations.   Gastrointestinal: Negative for abdominal pain, blood in stool, constipation, diarrhea, nausea and vomiting.   Endocrine: Negative for polydipsia and polyuria.        Denies severe hypoglycemia, or symptoms of  dizziness, confusion, syncope, tremor.   Genitourinary: Negative for difficulty urinating, dysuria, flank pain, frequency, hematuria and urgency.   Musculoskeletal: Negative for arthralgias and joint swelling.   Skin: Negative for rash and wound.   Neurological:  "Negative for dizziness, tremors, syncope, speech difficulty, weakness, numbness and headaches.   Hematological: Negative for adenopathy.       Objective     Vitals:    01/29/21 0805   BP: 116/74   BP Location: Left upper arm   Patient Position: Sitting   Pulse: 91   Resp: 18   Temp: 36.7 °C (98 °F)   TempSrc: Temporal   SpO2: 98%   Weight: 85.3 kg (188 lb)   Height: 1.753 m (5' 9.02\")     Body mass index is 27.75 kg/m².    Physical Exam  Constitutional:       General: He is not in acute distress.     Appearance: He is well-developed.   HENT:      Head: Normocephalic and atraumatic.      Right Ear: External ear normal.      Left Ear: External ear normal.      Nose: Nose normal.   Eyes:      General: Lids are normal.      Conjunctiva/sclera: Conjunctivae normal.      Right eye: Right conjunctiva is not injected. No chemosis.     Left eye: Left conjunctiva is not injected. No chemosis.     Pupils: Pupils are equal, round, and reactive to light.      Funduscopic exam:     Right eye: No exudate or papilledema.         Left eye: No exudate or papilledema.   Neck:      Musculoskeletal: Full passive range of motion without pain.      Thyroid: No thyroid mass or thyromegaly.      Vascular: No carotid bruit or JVD.   Cardiovascular:      Rate and Rhythm: Normal rate and regular rhythm.      Chest Wall: PMI is not displaced.      Pulses:           Dorsalis pedis pulses are 2+ on the right side and 2+ on the left side.        Posterior tibial pulses are 2+ on the right side and 2+ on the left side.      Heart sounds: No murmur. No friction rub. No gallop.    Pulmonary:      Effort: Pulmonary effort is normal. No respiratory distress.      Breath sounds: Normal breath sounds. No wheezing or rales.   Chest:      Chest wall: No tenderness.   Abdominal:      General: Bowel sounds are normal.      Palpations: Abdomen is soft.      Tenderness: There is no abdominal tenderness.   Musculoskeletal:      Right foot: No deformity.      " Left foot: No deformity.   Feet:      Right foot:      Protective Sensation: 5 sites tested. 5 sites sensed.      Skin integrity: No ulcer, blister, skin breakdown, erythema or warmth.      Left foot:      Protective Sensation: 5 sites tested. 5 sites sensed.      Skin integrity: No ulcer, blister, skin breakdown, erythema or warmth.   Lymphadenopathy:      Head:      Right side of head: No tonsillar adenopathy.      Left side of head: No tonsillar adenopathy.      Cervical: No cervical adenopathy.   Skin:     General: Skin is warm and dry.      Findings: No lesion or rash.   Neurological:      Mental Status: He is alert.      Cranial Nerves: No cranial nerve deficit.      Sensory: No sensory deficit.      Deep Tendon Reflexes: Reflexes are normal and symmetric.   Psychiatric:         Speech: Speech normal.         Behavior: Behavior normal. Behavior is cooperative.         Assessment/Plan   Diagnoses and all orders for this visit:    Essential hypertension (Primary)  Assessment & Plan:  Well-controlled: Continue current Rx, labs in annual physical in June    Orders:  -     Comprehensive metabolic panel; Future  -     CBC and Differential; Future  -     losartan (COZAAR) 25 mg tablet; Take 1 tablet (25 mg total) by mouth once daily.    Pure hypercholesterolemia  Assessment & Plan:  Stable, LDL a few points above target in June.  Reviewed diet, continue atorvastatin.    Orders:  -     Comprehensive metabolic panel; Future  -     Lipid panel; Future  -     atorvastatin (LIPITOR) 10 mg tablet; Take 1 tablet (10 mg total) by mouth once daily.    Controlled type 2 diabetes mellitus without complication, without long-term current use of insulin (CMS/[facility])  Assessment & Plan:  Stable, A1c is below 6.0 for years.Focus on diet and weight loss.    Orders:  -     Comprehensive metabolic panel; Future  -     Urinalysis with Reflex Culture (ED and Outpatient only); Future  -     Hemoglobin A1c; Future  -      Microalbumin/Creatinine Ur Random; Future  -     metFORMIN XR (GLUCOPHAGE-XR) 500 mg 24 hr tablet; Take 2 tablets by mouth two times daily with meals  -     CONTOUR NEXT TEST STRIPS strip; TEST BLOOD SUGAR ONCE DAILY    Idiopathic chronic gout without tophus, unspecified site    Screening of cancer  -     PSA; Future

## 2021-02-19 ENCOUNTER — TELEPHONE (OUTPATIENT)
Dept: FAMILY MEDICINE | Facility: CLINIC | Age: 56
End: 2021-02-19

## 2021-03-28 ENCOUNTER — IMMUNIZATION (OUTPATIENT)
Dept: IMMUNIZATION | Facility: CLINIC | Age: 56
End: 2021-03-28

## 2021-04-14 DIAGNOSIS — Z23 ENCOUNTER FOR IMMUNIZATION: ICD-10-CM

## 2021-04-19 ENCOUNTER — IMMUNIZATION (OUTPATIENT)
Dept: IMMUNIZATION | Facility: CLINIC | Age: 56
End: 2021-04-19
Attending: FAMILY MEDICINE

## 2021-07-03 LAB
ALBUMIN SERPL-MCNC: 4.5 G/DL (ref 3.6–5.1)
ALBUMIN/CREAT UR: 9 MCG/MG CREAT
ALBUMIN/GLOB SERPL: 1.6 (CALC) (ref 1–2.5)
ALP SERPL-CCNC: 57 U/L (ref 35–144)
ALT SERPL-CCNC: 23 U/L (ref 9–46)
APPEARANCE UR: CLEAR
AST SERPL-CCNC: 21 U/L (ref 10–35)
BACTERIA #/AREA URNS HPF: NORMAL /HPF
BACTERIA UR CULT: NORMAL
BASOPHILS # BLD AUTO: 51 CELLS/UL (ref 0–200)
BASOPHILS NFR BLD AUTO: 0.8 %
BILIRUB SERPL-MCNC: 0.8 MG/DL (ref 0.2–1.2)
BILIRUB UR QL STRIP: NEGATIVE
BUN SERPL-MCNC: 15 MG/DL (ref 7–25)
BUN/CREAT SERPL: ABNORMAL (CALC) (ref 6–22)
CALCIUM SERPL-MCNC: 9.3 MG/DL (ref 8.6–10.3)
CHLORIDE SERPL-SCNC: 106 MMOL/L (ref 98–110)
CHOLEST SERPL-MCNC: 141 MG/DL
CHOLEST/HDLC SERPL: 2.5 (CALC)
CO2 SERPL-SCNC: 25 MMOL/L (ref 20–32)
COLOR UR: YELLOW
CREAT SERPL-MCNC: 0.79 MG/DL (ref 0.7–1.33)
CREAT UR-MCNC: 145 MG/DL (ref 20–320)
EOSINOPHIL # BLD AUTO: 262 CELLS/UL (ref 15–500)
EOSINOPHIL NFR BLD AUTO: 4.1 %
ERYTHROCYTE [DISTWIDTH] IN BLOOD BY AUTOMATED COUNT: 11.8 % (ref 11–15)
GLOBULIN SER CALC-MCNC: 2.8 G/DL (CALC) (ref 1.9–3.7)
GLUCOSE SERPL-MCNC: 119 MG/DL (ref 65–99)
GLUCOSE UR QL STRIP: NEGATIVE
HBA1C MFR BLD: 5.6 % OF TOTAL HGB
HCT VFR BLD AUTO: 42 % (ref 38.5–50)
HDLC SERPL-MCNC: 57 MG/DL
HGB BLD-MCNC: 14 G/DL (ref 13.2–17.1)
HGB UR QL STRIP: NEGATIVE
HYALINE CASTS #/AREA URNS LPF: NORMAL /LPF
KETONES UR QL STRIP: NEGATIVE
LDLC SERPL CALC-MCNC: 70 MG/DL (CALC)
LEUKOCYTE ESTERASE UR QL STRIP: NEGATIVE
LYMPHOCYTES # BLD AUTO: 1178 CELLS/UL (ref 850–3900)
LYMPHOCYTES NFR BLD AUTO: 18.4 %
MCH RBC QN AUTO: 30.5 PG (ref 27–33)
MCHC RBC AUTO-ENTMCNC: 33.3 G/DL (ref 32–36)
MCV RBC AUTO: 91.5 FL (ref 80–100)
MICROALBUMIN UR-MCNC: 1.3 MG/DL
MONOCYTES # BLD AUTO: 685 CELLS/UL (ref 200–950)
MONOCYTES NFR BLD AUTO: 10.7 %
NEUTROPHILS # BLD AUTO: 4224 CELLS/UL (ref 1500–7800)
NEUTROPHILS NFR BLD AUTO: 66 %
NITRITE UR QL STRIP: NEGATIVE
NONHDLC SERPL-MCNC: 84 MG/DL (CALC)
PH UR STRIP: 5.5 [PH] (ref 5–8)
PLATELET # BLD AUTO: 248 THOUSAND/UL (ref 140–400)
PMV BLD REES-ECKER: 10.9 FL (ref 7.5–12.5)
POTASSIUM SERPL-SCNC: 4.4 MMOL/L (ref 3.5–5.3)
PROT SERPL-MCNC: 7.3 G/DL (ref 6.1–8.1)
PROT UR QL STRIP: NEGATIVE
PSA SERPL-MCNC: 0.4 NG/ML
QUEST EGFR AFRICAN AMERICAN: 118 ML/MIN/1.73M2
QUEST EGFR NON-AFR. AMERICAN: 102 ML/MIN/1.73M2
RBC # BLD AUTO: 4.59 MILLION/UL (ref 4.2–5.8)
RBC #/AREA URNS HPF: NORMAL /HPF
SODIUM SERPL-SCNC: 140 MMOL/L (ref 135–146)
SP GR UR STRIP: 1.02 (ref 1–1.03)
SQUAMOUS #/AREA URNS HPF: NORMAL /HPF
TRIGL SERPL-MCNC: 65 MG/DL
WBC # BLD AUTO: 6.4 THOUSAND/UL (ref 3.8–10.8)
WBC #/AREA URNS HPF: NORMAL /HPF

## 2021-07-15 ENCOUNTER — OFFICE VISIT (OUTPATIENT)
Dept: FAMILY MEDICINE | Facility: CLINIC | Age: 56
End: 2021-07-15
Payer: COMMERCIAL

## 2021-07-15 VITALS
HEART RATE: 68 BPM | TEMPERATURE: 98 F | DIASTOLIC BLOOD PRESSURE: 80 MMHG | HEIGHT: 69 IN | WEIGHT: 176 LBS | OXYGEN SATURATION: 98 % | RESPIRATION RATE: 18 BRPM | BODY MASS INDEX: 26.07 KG/M2 | SYSTOLIC BLOOD PRESSURE: 116 MMHG

## 2021-07-15 DIAGNOSIS — Z87.39 HX OF GOUT: ICD-10-CM

## 2021-07-15 DIAGNOSIS — I10 ESSENTIAL HYPERTENSION: ICD-10-CM

## 2021-07-15 DIAGNOSIS — E78.00 PURE HYPERCHOLESTEROLEMIA: ICD-10-CM

## 2021-07-15 DIAGNOSIS — E11.9 CONTROLLED TYPE 2 DIABETES MELLITUS WITHOUT COMPLICATION, WITHOUT LONG-TERM CURRENT USE OF INSULIN (CMS/HCC): ICD-10-CM

## 2021-07-15 DIAGNOSIS — Z00.00 ROUTINE MEDICAL EXAM: Primary | ICD-10-CM

## 2021-07-15 PROBLEM — M10.072 ACUTE IDIOPATHIC GOUT INVOLVING TOE OF LEFT FOOT: Status: RESOLVED | Noted: 2020-07-09 | Resolved: 2021-07-15

## 2021-07-15 PROCEDURE — 3074F SYST BP LT 130 MM HG: CPT | Performed by: FAMILY MEDICINE

## 2021-07-15 PROCEDURE — 3079F DIAST BP 80-89 MM HG: CPT | Performed by: FAMILY MEDICINE

## 2021-07-15 PROCEDURE — 3008F BODY MASS INDEX DOCD: CPT | Performed by: FAMILY MEDICINE

## 2021-07-15 PROCEDURE — 99396 PREV VISIT EST AGE 40-64: CPT | Performed by: FAMILY MEDICINE

## 2021-07-15 ASSESSMENT — ENCOUNTER SYMPTOMS
BLOOD IN STOOL: 0
HEADACHES: 0
APPETITE CHANGE: 0
SINUS PRESSURE: 0
ACTIVITY CHANGE: 0
SORE THROAT: 0
PALPITATIONS: 0
DIFFICULTY URINATING: 0
SLEEP DISTURBANCE: 0
NAUSEA: 0
CONSTIPATION: 0
COUGH: 0
DIAPHORESIS: 0
POLYDIPSIA: 0
DIARRHEA: 0
CHILLS: 0
ABDOMINAL PAIN: 0
UNEXPECTED WEIGHT CHANGE: 0
FATIGUE: 0
WEAKNESS: 0
HEMATURIA: 0
SHORTNESS OF BREATH: 0
WOUND: 0
DIZZINESS: 0
ENDOCRINE COMMENTS: DENIES SEVERE HYPOGLYCEMIA, OR SYMPTOMS OF  DIZZINESS, CONFUSION, SYNCOPE, TREMOR.
ARTHRALGIAS: 0
EYE PAIN: 0
SPEECH DIFFICULTY: 0
FLANK PAIN: 0
NUMBNESS: 0
FREQUENCY: 0
JOINT SWELLING: 0
TREMORS: 0
TROUBLE SWALLOWING: 0
EYE REDNESS: 0
FEVER: 0

## 2021-07-15 NOTE — ASSESSMENT & PLAN NOTE
He is in terrific shape, diabetes control is optimal 1 month Metformin monotherapy, weight is stable, A1c is at nondiabetic level so he may go a full year before for labs.

## 2021-07-15 NOTE — ASSESSMENT & PLAN NOTE
Well-controlled, LDL is at target at 70.  Once annual labs, stick with current atorvastatin dosage.

## 2021-07-15 NOTE — PROGRESS NOTES
"      Subjective  Patient returns for annual complete physical exam and follow-up of medical problems including diabetes hypertension hyperlipidemia. He received Pfizer Covid vaccine and remains in excellent health.  Sleeps well reports good energy.  Review of systems entirely negative per below. Recent labs and my summary to pt)  from last week :    \"Glucose is climbed higher in the prediabetic range at 119, up from 104 last year and only 6 points from official diabetes diagnosis level of 125. Hemoglobin A1c remains borderline but in the nondiabetic range at 5.6, down from 5.7 last year. Stick with current Metformin dosing.   LDL is at target at 70.   PSA 0.4.    Urine microalbumin is negative, urinalysis was clear. Cut way down on carbs, work on weight loss, increase exercise, avoid sugary drinks, we should carry out repeat glucose as well as hemoglobin A1c in 3 months to be sure that things are getting better.\"    He has not had gout for years ( 15-20 yrs ago it was frequent).  Has occasional alcohol, smoked as a teenager quit at age 20.      Patient ID: Samuel Patricia is a 55 y.o. male.  1965      HPI    The following have been reviewed and updated as appropriate in this visit:  Allergies  Meds  Problems       Review of Systems   Constitutional: Negative for activity change, appetite change, chills, diaphoresis, fatigue, fever and unexpected weight change.   HENT: Negative for ear pain, mouth sores, sinus pressure, sore throat and trouble swallowing.    Eyes: Negative for pain, redness and visual disturbance.   Respiratory: Negative for cough and shortness of breath.    Cardiovascular: Negative for chest pain and palpitations.   Gastrointestinal: Negative for abdominal pain, blood in stool, constipation, diarrhea and nausea.   Endocrine: Negative for polydipsia and polyuria.        Denies severe hypoglycemia, or symptoms of  dizziness, confusion, syncope, tremor.   Genitourinary: Negative for " "difficulty urinating, flank pain, frequency, hematuria and urgency.   Musculoskeletal: Negative for arthralgias and joint swelling.   Skin: Negative for rash and wound.   Neurological: Negative for dizziness, tremors, syncope, speech difficulty, weakness, numbness and headaches.   Psychiatric/Behavioral: Negative for behavioral problems and sleep disturbance.       Objective     Vitals:    07/15/21 0802   BP: 116/80   BP Location: Left upper arm   Patient Position: Sitting   Pulse: 68   Resp: 18   Temp: 36.7 °C (98 °F)   TempSrc: Temporal   SpO2: 98%   Weight: 79.8 kg (176 lb)   Height: 1.753 m (5' 9.02\")     Body mass index is 25.98 kg/m².    Physical Exam  Constitutional:       General: He is not in acute distress.     Appearance: He is well-developed.   HENT:      Head: Normocephalic and atraumatic.      Right Ear: External ear normal.      Left Ear: External ear normal.      Nose: Nose normal.   Eyes:      General: Lids are normal.      Conjunctiva/sclera: Conjunctivae normal.      Right eye: Right conjunctiva is not injected. No chemosis.     Left eye: Left conjunctiva is not injected. No chemosis.     Pupils: Pupils are equal, round, and reactive to light.      Funduscopic exam:     Right eye: No exudate or papilledema.         Left eye: No exudate or papilledema.   Neck:      Thyroid: No thyroid mass or thyromegaly.      Vascular: No carotid bruit or JVD.   Cardiovascular:      Rate and Rhythm: Normal rate and regular rhythm.      Chest Wall: PMI is not displaced.      Pulses:           Dorsalis pedis pulses are 2+ on the right side and 2+ on the left side.        Posterior tibial pulses are 2+ on the right side and 2+ on the left side.      Heart sounds: No murmur heard.   No friction rub. No gallop.    Pulmonary:      Effort: Pulmonary effort is normal. No respiratory distress.      Breath sounds: Normal breath sounds. No wheezing or rales.   Chest:      Chest wall: No tenderness.   Abdominal:      General: " Bowel sounds are normal.      Palpations: Abdomen is soft.      Tenderness: There is no abdominal tenderness.   Genitourinary:     Prostate: Normal.      Rectum: Normal. Guaiac result negative.      Comments: J CARLOS normal: prostate, stool heme negative.  Musculoskeletal:      Cervical back: Full passive range of motion without pain.      Right foot: No deformity.      Left foot: No deformity.   Feet:      Right foot:      Protective Sensation: 5 sites tested. 5 sites sensed.      Skin integrity: No ulcer, blister, skin breakdown, erythema or warmth.      Left foot:      Protective Sensation: 5 sites tested. 5 sites sensed.      Skin integrity: No ulcer, blister, skin breakdown, erythema or warmth.   Lymphadenopathy:      Head:      Right side of head: No tonsillar adenopathy.      Left side of head: No tonsillar adenopathy.      Cervical: No cervical adenopathy.   Skin:     General: Skin is warm and dry.      Findings: No lesion or rash.   Neurological:      Mental Status: He is alert.      Cranial Nerves: No cranial nerve deficit.      Sensory: No sensory deficit.      Deep Tendon Reflexes: Reflexes are normal and symmetric.   Psychiatric:         Speech: Speech normal.         Behavior: Behavior normal. Behavior is cooperative.         Assessment/Plan   Diagnoses and all orders for this visit:    Routine medical exam (Primary)  Assessment & Plan:  He is in terrific shape, diabetes control is optimal 1 month Metformin monotherapy, weight is stable, A1c is at nondiabetic level so he may go a full year before for labs.      Essential hypertension  Assessment & Plan:  Well-controlled on lowest dose losartan.  Follow-up in 6 months.      Pure hypercholesterolemia  Assessment & Plan:  Well-controlled, LDL is at target at 70.  Once annual labs, stick with current atorvastatin dosage.      Controlled type 2 diabetes mellitus without complication, without long-term current use of insulin (CMS/[facility])    Hx of gout  Assessment &  Plan:  Diet controlled for years with no recurrences.  Reviewed low purine diet guidelines peer

## 2021-07-15 NOTE — PATIENT INSTRUCTIONS
Routine medical exam  He is in terrific shape, diabetes control is optimal 1 month Metformin monotherapy, weight is stable, A1c is at nondiabetic level so he may go a full year before for labs.    Pure hypercholesterolemia  Well-controlled, LDL is at target at 70.  Once annual labs, stick with current atorvastatin dosage.    Essential hypertension  Well-controlled on lowest dose losartan.  Follow-up in 6 months.    Hx of gout  Diet controlled for years with no recurrences.  Reviewed low purine diet guidelines peer

## 2021-08-20 DIAGNOSIS — I10 ESSENTIAL HYPERTENSION: ICD-10-CM

## 2021-08-20 DIAGNOSIS — E78.00 PURE HYPERCHOLESTEROLEMIA: ICD-10-CM

## 2021-08-23 RX ORDER — LOSARTAN POTASSIUM 25 MG/1
TABLET ORAL
Qty: 90 TABLET | Refills: 1 | Status: SHIPPED | OUTPATIENT
Start: 2021-08-23 | End: 2021-11-08

## 2021-08-23 RX ORDER — ATORVASTATIN CALCIUM 10 MG/1
TABLET, FILM COATED ORAL
Qty: 90 TABLET | Refills: 1 | Status: SHIPPED | OUTPATIENT
Start: 2021-08-23 | End: 2022-01-27

## 2021-11-05 DIAGNOSIS — I10 ESSENTIAL HYPERTENSION: ICD-10-CM

## 2021-11-08 RX ORDER — LOSARTAN POTASSIUM 25 MG/1
TABLET ORAL
Qty: 90 TABLET | Refills: 0 | Status: SHIPPED | OUTPATIENT
Start: 2021-11-08 | End: 2022-01-28

## 2022-01-26 DIAGNOSIS — E78.00 PURE HYPERCHOLESTEROLEMIA: ICD-10-CM

## 2022-01-27 RX ORDER — ATORVASTATIN CALCIUM 10 MG/1
TABLET, FILM COATED ORAL
Qty: 90 TABLET | Refills: 1 | Status: SHIPPED | OUTPATIENT
Start: 2022-01-27 | End: 2022-09-27 | Stop reason: SDUPTHER

## 2022-01-28 DIAGNOSIS — I10 ESSENTIAL HYPERTENSION: ICD-10-CM

## 2022-01-28 RX ORDER — LOSARTAN POTASSIUM 25 MG/1
TABLET ORAL
Qty: 90 TABLET | Refills: 0 | Status: SHIPPED | OUTPATIENT
Start: 2022-01-28 | End: 2022-07-08

## 2022-03-25 DIAGNOSIS — E11.9 CONTROLLED TYPE 2 DIABETES MELLITUS WITHOUT COMPLICATION, WITHOUT LONG-TERM CURRENT USE OF INSULIN (CMS/HCC): ICD-10-CM

## 2022-03-25 RX ORDER — METFORMIN HYDROCHLORIDE 500 MG/1
TABLET, EXTENDED RELEASE ORAL
Qty: 360 TABLET | Refills: 1 | Status: SHIPPED | OUTPATIENT
Start: 2022-03-25 | End: 2022-09-27 | Stop reason: SDUPTHER

## 2022-04-22 ENCOUNTER — OFFICE VISIT (OUTPATIENT)
Dept: FAMILY MEDICINE | Facility: CLINIC | Age: 57
End: 2022-04-22
Payer: COMMERCIAL

## 2022-04-22 VITALS
OXYGEN SATURATION: 99 % | HEIGHT: 70 IN | DIASTOLIC BLOOD PRESSURE: 74 MMHG | TEMPERATURE: 97.8 F | WEIGHT: 182.8 LBS | SYSTOLIC BLOOD PRESSURE: 116 MMHG | RESPIRATION RATE: 14 BRPM | BODY MASS INDEX: 26.17 KG/M2 | HEART RATE: 95 BPM

## 2022-04-22 DIAGNOSIS — Z23 NEED FOR PROPHYLACTIC VACCINATION AGAINST DIPHTHERIA AND TETANUS: ICD-10-CM

## 2022-04-22 DIAGNOSIS — I10 ESSENTIAL HYPERTENSION: ICD-10-CM

## 2022-04-22 DIAGNOSIS — Z12.9 SCREENING OF CANCER: ICD-10-CM

## 2022-04-22 DIAGNOSIS — E11.9 CONTROLLED TYPE 2 DIABETES MELLITUS WITHOUT COMPLICATION, WITHOUT LONG-TERM CURRENT USE OF INSULIN (CMS/HCC): Primary | ICD-10-CM

## 2022-04-22 DIAGNOSIS — R91.1 NODULE OF LEFT LUNG: ICD-10-CM

## 2022-04-22 DIAGNOSIS — E78.5 HYPERLIPIDEMIA WITH TARGET LDL LESS THAN 70: ICD-10-CM

## 2022-04-22 PROCEDURE — 3008F BODY MASS INDEX DOCD: CPT | Performed by: FAMILY MEDICINE

## 2022-04-22 PROCEDURE — 3074F SYST BP LT 130 MM HG: CPT | Performed by: FAMILY MEDICINE

## 2022-04-22 PROCEDURE — 3078F DIAST BP <80 MM HG: CPT | Performed by: FAMILY MEDICINE

## 2022-04-22 PROCEDURE — 99214 OFFICE O/P EST MOD 30 MIN: CPT | Performed by: FAMILY MEDICINE

## 2022-04-22 ASSESSMENT — ENCOUNTER SYMPTOMS
BLOOD IN STOOL: 0
POLYDIPSIA: 0
DIZZINESS: 0
ABDOMINAL PAIN: 0
FLANK PAIN: 0
UNEXPECTED WEIGHT CHANGE: 0
FREQUENCY: 0
NAUSEA: 0
CHILLS: 0
EYE REDNESS: 0
HEMATURIA: 0
WEAKNESS: 0
ACTIVITY CHANGE: 0
SORE THROAT: 0
EYE PAIN: 0
PALPITATIONS: 0
JOINT SWELLING: 0
DIARRHEA: 0
DIFFICULTY URINATING: 0
WOUND: 0
TREMORS: 0
SHORTNESS OF BREATH: 0
COUGH: 0
ARTHRALGIAS: 0
ENDOCRINE COMMENTS: DENIES SEVERE HYPOGLYCEMIA, OR SYMPTOMS OF  DIZZINESS, CONFUSION, SYNCOPE, TREMOR.
DIAPHORESIS: 0
HEADACHES: 0
NUMBNESS: 0
APPETITE CHANGE: 0
SPEECH DIFFICULTY: 0
FATIGUE: 0
FEVER: 0
TROUBLE SWALLOWING: 0
SINUS PRESSURE: 0
CONSTIPATION: 0

## 2022-04-22 ASSESSMENT — PAIN SCALES - GENERAL: PAINLEVEL: 0-NO PAIN

## 2022-04-22 NOTE — PROGRESS NOTES
Subjective    Patient returns for follow-up of diabetes hypertension and hyperlipidemia.  He had only a six-point calcium score in July 2020 which was slightly above average for age and a 1.6 cm groundglass nodule was noted in the left mid lung/lingula region for which repeat chest CT was recommended in 6 to 12 months which has not been carried out to date.  Blood pressure remains well controlled on low dose losartan.  Most recent labs were in July 2021 when glucose was 119, A1c okay at 5.6, and LDL was at target at 70.    He plans 4th Pfizer dose   Patient ID: Samuel Patricia is a 56 y.o. male.  1965      HPI    The following have been reviewed and updated as appropriate in this visit:   Tobacco  Allergies  Meds  Problems  Med Hx  Surg Hx  Fam Hx         Review of Systems   Constitutional: Negative for activity change, appetite change, chills, diaphoresis, fatigue, fever and unexpected weight change.   HENT: Negative for ear pain, mouth sores, sinus pressure, sore throat and trouble swallowing.    Eyes: Negative for pain, redness and visual disturbance.   Respiratory: Negative for cough and shortness of breath.    Cardiovascular: Negative for chest pain and palpitations.   Gastrointestinal: Negative for abdominal pain, blood in stool, constipation, diarrhea and nausea.   Endocrine: Negative for polydipsia and polyuria.        Denies severe hypoglycemia, or symptoms of  dizziness, confusion, syncope, tremor.   Genitourinary: Negative for difficulty urinating, flank pain, frequency, hematuria and urgency.   Musculoskeletal: Negative for arthralgias and joint swelling.   Skin: Negative for rash and wound.   Neurological: Negative for dizziness, tremors, syncope, speech difficulty, weakness, numbness and headaches.       Objective     Vitals:    04/22/22 0801   BP: 116/74   BP Location: Left upper arm   Patient Position: Sitting   Pulse: 95   Resp: 14   Temp: 36.6 °C (97.8 °F)   TempSrc: Temporal  "  SpO2: 99%   Weight: 82.9 kg (182 lb 12.8 oz)   Height: 1.778 m (5' 10\")     Body mass index is 26.23 kg/m².    Physical Exam  Constitutional:       General: He is not in acute distress.     Appearance: He is well-developed.   HENT:      Head: Normocephalic and atraumatic.      Right Ear: External ear normal.      Left Ear: External ear normal.      Nose: Nose normal.   Eyes:      General: Lids are normal.      Conjunctiva/sclera: Conjunctivae normal.      Right eye: Right conjunctiva is not injected. No chemosis.     Left eye: Left conjunctiva is not injected. No chemosis.     Pupils: Pupils are equal, round, and reactive to light.      Funduscopic exam:     Right eye: No exudate or papilledema.         Left eye: No exudate or papilledema.   Neck:      Thyroid: No thyroid mass or thyromegaly.      Vascular: No carotid bruit or JVD.   Cardiovascular:      Rate and Rhythm: Normal rate and regular rhythm.      Chest Wall: PMI is not displaced.      Pulses:           Dorsalis pedis pulses are 2+ on the right side and 2+ on the left side.        Posterior tibial pulses are 2+ on the right side and 2+ on the left side.      Heart sounds: No murmur heard.    No friction rub. No gallop.   Pulmonary:      Effort: Pulmonary effort is normal. No respiratory distress.      Breath sounds: Normal breath sounds. No wheezing or rales.   Chest:      Chest wall: No tenderness.   Abdominal:      General: Bowel sounds are normal.      Palpations: Abdomen is soft.      Tenderness: There is no abdominal tenderness.   Musculoskeletal:      Cervical back: Full passive range of motion without pain.      Right foot: No deformity.      Left foot: No deformity.   Feet:      Right foot:      Protective Sensation: 5 sites tested. 5 sites sensed.      Skin integrity: No ulcer, blister, skin breakdown, erythema or warmth.      Left foot:      Protective Sensation: 5 sites tested. 5 sites sensed.      Skin integrity: No ulcer, blister, skin " breakdown, erythema or warmth.   Lymphadenopathy:      Head:      Right side of head: No tonsillar adenopathy.      Left side of head: No tonsillar adenopathy.      Cervical: No cervical adenopathy.   Skin:     General: Skin is warm and dry.      Findings: No lesion or rash.   Neurological:      Mental Status: He is alert.      Cranial Nerves: No cranial nerve deficit.      Sensory: No sensory deficit.      Deep Tendon Reflexes: Reflexes are normal and symmetric.   Psychiatric:         Speech: Speech normal.         Behavior: Behavior normal. Behavior is cooperative.         Assessment/Plan   Diagnoses and all orders for this visit:    Controlled type 2 diabetes mellitus without complication, without long-term current use of insulin (CMS/[facility]) (Primary)  Assessment & Plan:  Under excellent control: Continue low-dose metformin, once annual labs are okay in light of his nondiabetic level A1c last year at 5.4 so we will wait until July to get everything completed at that time including annual PSA.  Follow-up will be in 6 months.    Orders:  -     Comprehensive metabolic panel; Future  -     Hemoglobin A1c; Future  -     Microalbumin/Creatinine Ur Random; Future    Essential hypertension  Assessment & Plan:  Well-controlled: Stick with lowest dose losartan follow-up 6 months.    Orders:  -     CBC and Differential; Future  -     Urinalysis with Reflex Culture (ED and Outpatient only); Future    Nodule of left lung  Assessment & Plan:  6.5 mm groundglass nodule noted in patient who only had a few years of infrequent social smoking as a teenager and no history of documented lung infection.  Repeat chest CT and if this 1 prove stability he will not require any additional follow-up since this will be a 2-year follow-up since the cardiac CT.    Orders:  -     CT CHEST WITHOUT IV CONTRAST; Future    Screening of cancer  -     PSA; Future    Hyperlipidemia with target LDL less than 70  Assessment & Plan:  Well-controlled stick  with a atorvastatin: He only had a six-point calcium score but that turned out to be a few percentage points above average for his age, and that combined with diabetes renders him with LDL target below 70.    Orders:  -     Lipid panel; Future    Need for prophylactic vaccination against diphtheria and tetanus  Assessment & Plan:  Since the COVID booster takes priority now on he especially is eager to get that since work will require travel again starting next month, that becomes priority and we will postpone the needed Adacel vaccine till his follow-up visit in 6 months.

## 2022-04-22 NOTE — PATIENT INSTRUCTIONS
Chest CT soon,   Labs in July    CPE with Dr Burr in 6 months.,     Controlled type 2 diabetes mellitus without complication, without long-term current use of insulin (CMS/[facility])  Under excellent control: Continue low-dose metformin, once annual labs are okay in light of his nondiabetic level A1c last year at 5.4 so we will wait until July to get everything completed at that time including annual PSA.  Follow-up will be in 6 months.    Essential hypertension  Well-controlled: Stick with lowest dose losartan follow-up 6 months.    Hyperlipidemia with target LDL less than 70  Well-controlled stick with a atorvastatin: He only had a six-point calcium score but that turned out to be a few percentage points above average for his age, and that combined with diabetes renders him with LDL target below 70.    Need for prophylactic vaccination against diphtheria and tetanus  Since the COVID booster takes priority now on he especially is eager to get that since work will require travel again starting next month, that becomes priority and we will postpone the needed Adacel vaccine till his follow-up visit in 6 months.    Nodule of left lung  6.5 mm groundglass nodule noted in patient who only had a few years of infrequent social smoking as a teenager and no history of documented lung infection.  Repeat chest CT and if this 1 prove stability he will not require any additional follow-up since this will be a 2-year follow-up since the cardiac CT.

## 2022-04-22 NOTE — ASSESSMENT & PLAN NOTE
Well-controlled stick with a atorvastatin: He only had a six-point calcium score but that turned out to be a few percentage points above average for his age, and that combined with diabetes renders him with LDL target below 70.

## 2022-04-22 NOTE — ASSESSMENT & PLAN NOTE
Since the COVID booster takes priority now on he especially is eager to get that since work will require travel again starting next month, that becomes priority and we will postpone the needed Adacel vaccine till his follow-up visit in 6 months.

## 2022-04-22 NOTE — ASSESSMENT & PLAN NOTE
6.5 mm groundglass nodule noted in patient who only had a few years of infrequent social smoking as a teenager and no history of documented lung infection.  Repeat chest CT and if this 1 prove stability he will not require any additional follow-up since this will be a 2-year follow-up since the cardiac CT.

## 2022-04-22 NOTE — ASSESSMENT & PLAN NOTE
Under excellent control: Continue low-dose metformin, once annual labs are okay in light of his nondiabetic level A1c last year at 5.4 so we will wait until July to get everything completed at that time including annual PSA.  Follow-up will be in 6 months.

## 2022-04-28 ENCOUNTER — HOSPITAL ENCOUNTER (OUTPATIENT)
Dept: RADIOLOGY | Age: 57
Discharge: HOME | End: 2022-04-28
Attending: FAMILY MEDICINE
Payer: COMMERCIAL

## 2022-04-28 DIAGNOSIS — R91.1 NODULE OF LEFT LUNG: ICD-10-CM

## 2022-04-28 PROCEDURE — 71250 CT THORAX DX C-: CPT | Mod: ME

## 2022-07-07 DIAGNOSIS — I10 ESSENTIAL HYPERTENSION: ICD-10-CM

## 2022-07-08 RX ORDER — LOSARTAN POTASSIUM 25 MG/1
TABLET ORAL
Qty: 90 TABLET | Refills: 0 | Status: SHIPPED | OUTPATIENT
Start: 2022-07-08 | End: 2022-09-23

## 2022-07-22 LAB
ALBUMIN SERPL-MCNC: 4.4 G/DL (ref 3.6–5.1)
ALBUMIN/CREAT UR: 14 MCG/MG CREAT
ALBUMIN/GLOB SERPL: 1.6 (CALC) (ref 1–2.5)
ALP SERPL-CCNC: 58 U/L (ref 35–144)
ALT SERPL-CCNC: 31 U/L (ref 9–46)
APPEARANCE UR: CLEAR
AST SERPL-CCNC: 26 U/L (ref 10–35)
BACTERIA #/AREA URNS HPF: NORMAL /HPF
BACTERIA UR CULT: NORMAL
BASOPHILS # BLD AUTO: 32 CELLS/UL (ref 0–200)
BASOPHILS NFR BLD AUTO: 0.5 %
BILIRUB SERPL-MCNC: 0.6 MG/DL (ref 0.2–1.2)
BILIRUB UR QL STRIP: NEGATIVE
BUN SERPL-MCNC: 13 MG/DL (ref 7–25)
BUN/CREAT SERPL: ABNORMAL (CALC) (ref 6–22)
CALCIUM SERPL-MCNC: 9.3 MG/DL (ref 8.6–10.3)
CHLORIDE SERPL-SCNC: 103 MMOL/L (ref 98–110)
CHOLEST SERPL-MCNC: 120 MG/DL
CHOLEST/HDLC SERPL: 2.1 (CALC)
CO2 SERPL-SCNC: 29 MMOL/L (ref 20–32)
COLOR UR: YELLOW
CREAT SERPL-MCNC: 0.82 MG/DL (ref 0.7–1.3)
CREAT UR-MCNC: 181 MG/DL (ref 20–320)
EGFRCR SERPLBLD CKD-EPI 2021: 104 ML/MIN/1.73M2
EOSINOPHIL # BLD AUTO: 211 CELLS/UL (ref 15–500)
EOSINOPHIL NFR BLD AUTO: 3.3 %
ERYTHROCYTE [DISTWIDTH] IN BLOOD BY AUTOMATED COUNT: 11.8 % (ref 11–15)
GLOBULIN SER CALC-MCNC: 2.8 G/DL (CALC) (ref 1.9–3.7)
GLUCOSE SERPL-MCNC: 111 MG/DL (ref 65–99)
GLUCOSE UR QL STRIP: NEGATIVE
HBA1C MFR BLD: 6.1 % OF TOTAL HGB
HCT VFR BLD AUTO: 43.3 % (ref 38.5–50)
HDLC SERPL-MCNC: 56 MG/DL
HGB BLD-MCNC: 14.4 G/DL (ref 13.2–17.1)
HGB UR QL STRIP: NEGATIVE
HYALINE CASTS #/AREA URNS LPF: NORMAL /LPF
KETONES UR QL STRIP: NEGATIVE
LDLC SERPL CALC-MCNC: 50 MG/DL (CALC)
LEUKOCYTE ESTERASE UR QL STRIP: NEGATIVE
LYMPHOCYTES # BLD AUTO: 1523 CELLS/UL (ref 850–3900)
LYMPHOCYTES NFR BLD AUTO: 23.8 %
MCH RBC QN AUTO: 30.9 PG (ref 27–33)
MCHC RBC AUTO-ENTMCNC: 33.3 G/DL (ref 32–36)
MCV RBC AUTO: 92.9 FL (ref 80–100)
MICROALBUMIN UR-MCNC: 2.5 MG/DL
MONOCYTES # BLD AUTO: 704 CELLS/UL (ref 200–950)
MONOCYTES NFR BLD AUTO: 11 %
NEUTROPHILS # BLD AUTO: 3930 CELLS/UL (ref 1500–7800)
NEUTROPHILS NFR BLD AUTO: 61.4 %
NITRITE UR QL STRIP: NEGATIVE
NONHDLC SERPL-MCNC: 64 MG/DL (CALC)
PH UR STRIP: 5.5 [PH] (ref 5–8)
PLATELET # BLD AUTO: 240 THOUSAND/UL (ref 140–400)
PMV BLD REES-ECKER: 10.2 FL (ref 7.5–12.5)
POTASSIUM SERPL-SCNC: 4.6 MMOL/L (ref 3.5–5.3)
PROT SERPL-MCNC: 7.2 G/DL (ref 6.1–8.1)
PROT UR QL STRIP: NEGATIVE
PSA SERPL-MCNC: 0.52 NG/ML
RBC # BLD AUTO: 4.66 MILLION/UL (ref 4.2–5.8)
RBC #/AREA URNS HPF: NORMAL /HPF
SODIUM SERPL-SCNC: 140 MMOL/L (ref 135–146)
SP GR UR STRIP: 1.02 (ref 1–1.03)
SQUAMOUS #/AREA URNS HPF: NORMAL /HPF
TRIGL SERPL-MCNC: 64 MG/DL
WBC # BLD AUTO: 6.4 THOUSAND/UL (ref 3.8–10.8)
WBC #/AREA URNS HPF: NORMAL /HPF

## 2022-07-25 LAB — MLHC DIABETIC EYE EXAM (EXTERNAL): NORMAL

## 2022-09-01 ENCOUNTER — DOCUMENTATION (OUTPATIENT)
Dept: FAMILY MEDICINE | Facility: CLINIC | Age: 57
End: 2022-09-01
Payer: COMMERCIAL

## 2022-09-01 NOTE — PROGRESS NOTES
Jeimy Lr MA has completed a chart review for Samuel Patricia and have determined that the care gap has been satisfied.    Care Gap Source: [employer]    Care Gap(s) Identified: Colorectal Cancer Screening    Chart Review Completed: Yes       Pt had a colonoscopy completed on 10/24/16, up to date until 10/2026.

## 2022-09-14 LAB — MLHC DIABETIC EYE EXAM (EXTERNAL): NORMAL

## 2022-09-22 DIAGNOSIS — I10 ESSENTIAL HYPERTENSION: ICD-10-CM

## 2022-09-23 RX ORDER — LOSARTAN POTASSIUM 25 MG/1
TABLET ORAL
Qty: 90 TABLET | Refills: 0 | Status: SHIPPED | OUTPATIENT
Start: 2022-09-23 | End: 2022-09-23 | Stop reason: SDUPTHER

## 2022-09-23 RX ORDER — LOSARTAN POTASSIUM 25 MG/1
25 TABLET ORAL
Qty: 90 TABLET | Refills: 0 | Status: SHIPPED | OUTPATIENT
Start: 2022-09-23 | End: 2022-10-26

## 2022-09-27 DIAGNOSIS — E11.9 CONTROLLED TYPE 2 DIABETES MELLITUS WITHOUT COMPLICATION, WITHOUT LONG-TERM CURRENT USE OF INSULIN (CMS/HCC): ICD-10-CM

## 2022-09-27 DIAGNOSIS — E78.00 PURE HYPERCHOLESTEROLEMIA: ICD-10-CM

## 2022-09-27 RX ORDER — METFORMIN HYDROCHLORIDE 500 MG/1
TABLET, EXTENDED RELEASE ORAL
Qty: 360 TABLET | Refills: 0 | Status: SHIPPED | OUTPATIENT
Start: 2022-09-27 | End: 2022-10-26

## 2022-09-27 RX ORDER — ATORVASTATIN CALCIUM 10 MG/1
10 TABLET, FILM COATED ORAL
Qty: 90 TABLET | Refills: 0 | Status: SHIPPED | OUTPATIENT
Start: 2022-09-27 | End: 2022-10-26

## 2022-10-26 ENCOUNTER — TELEPHONE (OUTPATIENT)
Dept: FAMILY MEDICINE | Facility: CLINIC | Age: 57
End: 2022-10-26

## 2022-10-26 ENCOUNTER — OFFICE VISIT (OUTPATIENT)
Dept: FAMILY MEDICINE | Facility: CLINIC | Age: 57
End: 2022-10-26
Payer: COMMERCIAL

## 2022-10-26 VITALS
DIASTOLIC BLOOD PRESSURE: 82 MMHG | HEIGHT: 70 IN | HEART RATE: 104 BPM | SYSTOLIC BLOOD PRESSURE: 120 MMHG | OXYGEN SATURATION: 99 % | TEMPERATURE: 98.2 F | BODY MASS INDEX: 26.05 KG/M2 | WEIGHT: 182 LBS | RESPIRATION RATE: 16 BRPM

## 2022-10-26 DIAGNOSIS — E11.9 TYPE 2 DIABETES MELLITUS WITHOUT COMPLICATION, WITHOUT LONG-TERM CURRENT USE OF INSULIN (CMS/HCC): ICD-10-CM

## 2022-10-26 DIAGNOSIS — Z12.11 ENCOUNTER FOR COLORECTAL CANCER SCREENING: ICD-10-CM

## 2022-10-26 DIAGNOSIS — Z12.12 ENCOUNTER FOR COLORECTAL CANCER SCREENING: ICD-10-CM

## 2022-10-26 DIAGNOSIS — Z00.00 ANNUAL PHYSICAL EXAM: Primary | ICD-10-CM

## 2022-10-26 DIAGNOSIS — Z23 NEED FOR TDAP VACCINATION: ICD-10-CM

## 2022-10-26 DIAGNOSIS — I10 ESSENTIAL HYPERTENSION: ICD-10-CM

## 2022-10-26 DIAGNOSIS — E78.00 HYPERCHOLESTEROLEMIA: ICD-10-CM

## 2022-10-26 PROCEDURE — 3074F SYST BP LT 130 MM HG: CPT | Performed by: FAMILY MEDICINE

## 2022-10-26 PROCEDURE — 90715 TDAP VACCINE 7 YRS/> IM: CPT | Performed by: FAMILY MEDICINE

## 2022-10-26 PROCEDURE — 3079F DIAST BP 80-89 MM HG: CPT | Performed by: FAMILY MEDICINE

## 2022-10-26 PROCEDURE — 99396 PREV VISIT EST AGE 40-64: CPT | Mod: 25 | Performed by: FAMILY MEDICINE

## 2022-10-26 PROCEDURE — 90471 IMMUNIZATION ADMIN: CPT | Performed by: FAMILY MEDICINE

## 2022-10-26 PROCEDURE — 3008F BODY MASS INDEX DOCD: CPT | Performed by: FAMILY MEDICINE

## 2022-10-26 RX ORDER — ATORVASTATIN CALCIUM 10 MG/1
10 TABLET, FILM COATED ORAL NIGHTLY
Qty: 90 TABLET | Refills: 0 | COMMUNITY
Start: 2022-10-26 | End: 2023-01-27 | Stop reason: SDUPTHER

## 2022-10-26 RX ORDER — LOSARTAN POTASSIUM 25 MG/1
25 TABLET ORAL
Qty: 90 TABLET | Refills: 0 | COMMUNITY
Start: 2022-10-26 | End: 2022-10-26

## 2022-10-26 RX ORDER — METFORMIN HYDROCHLORIDE 500 MG/1
TABLET, EXTENDED RELEASE ORAL
Qty: 360 TABLET | Refills: 0 | COMMUNITY
Start: 2022-10-26 | End: 2023-01-27 | Stop reason: SDUPTHER

## 2022-10-26 RX ORDER — LOSARTAN POTASSIUM 25 MG/1
25 TABLET ORAL DAILY
Qty: 90 TABLET | Refills: 0 | COMMUNITY
Start: 2022-10-26 | End: 2023-01-27 | Stop reason: SDUPTHER

## 2022-10-26 RX ORDER — ATORVASTATIN CALCIUM 10 MG/1
10 TABLET, FILM COATED ORAL
Qty: 90 TABLET | Refills: 0 | COMMUNITY
Start: 2022-10-26 | End: 2022-10-26

## 2022-10-26 ASSESSMENT — ENCOUNTER SYMPTOMS
NAUSEA: 0
COUGH: 0
FEVER: 0
DIARRHEA: 1
TROUBLE SWALLOWING: 0
FATIGUE: 0
DIZZINESS: 0
VOMITING: 0
PALPITATIONS: 0
LIGHT-HEADEDNESS: 0
CONSTIPATION: 0
SHORTNESS OF BREATH: 0
ABDOMINAL PAIN: 0

## 2022-10-26 ASSESSMENT — PATIENT HEALTH QUESTIONNAIRE - PHQ9: SUM OF ALL RESPONSES TO PHQ9 QUESTIONS 1 & 2: 0

## 2022-10-26 NOTE — PROGRESS NOTES
Daily Progress Note       Patient ID: Samuel Patricia is a 57 y.o. male.    HPI    57-year-old male presents for annual physical exam.    Previously seen by Dr. Ash Nam.    He is  with 3 children - 2 daughters and 1 son (twins).    He is a [employer].     CT heart coronary calcium score done on 7/17/2020 with a score of 6    DM2 - currently on Metformin ER 1000 mg twice daily.  Last diabetic eye exam about 3-4 weeks ago      Essential hypertension - currently on losartan 25 mg daily    Hypercholesterolemia - currently on atorvastatin 10 mg nightly     Colonoscopy-10/24/2016, repeat in 5 to 10 years    COVID-19-Pfizer-3/28/2021, 4/19/2021, 10/22/2021, 4/22/2022; Pfizer - 10/23/2022    Influenza - 10/23/2022    Shingrix-12/11/2019, 2/20/2020    Last dental exam - every 6 months     The following have been reviewed and updated as appropriate in this visit:   Tobacco  Allergies  Meds  Problems  Med Hx  Surg Hx  Fam Hx       Review of Systems   Constitutional: Negative for fatigue and fever.   HENT: Negative for trouble swallowing.    Respiratory: Negative for cough and shortness of breath.    Cardiovascular: Negative for chest pain and palpitations.   Gastrointestinal: Positive for diarrhea (from Metformin). Negative for abdominal pain, constipation, nausea and vomiting.   Neurological: Negative for dizziness, syncope and light-headedness.   All other systems reviewed and are negative.            Current Outpatient Medications   Medication Sig Dispense Refill    atorvastatin (LIPITOR) 10 mg tablet Take 1 tablet (10 mg total) by mouth nightly. 90 tablet 0    blood-glucose meter misc test up to once daily in am before breakfast only.      CONTOUR NEXT TEST STRIPS strip TEST BLOOD SUGAR ONCE DAILY 100 strip 1    fexofenadine (ALLEGRA) 180 mg tablet 180 mg.      losartan (COZAAR) 25 mg tablet Take 1 tablet (25 mg total) by mouth daily. 90 tablet 0    metFORMIN XR (GLUCOPHAGE-XR) 500 mg 24 hr tablet  TAKE 2 TABLETS 2 TIMES     DAILY WITH MEALS 360 tablet 0     No current facility-administered medications for this visit.     Past Medical History:   Diagnosis Date    Hx of gout      Family History   Problem Relation Age of Onset    Stroke Biological Mother     Stroke Biological Father     Diabetes Biological Father     Diabetes Biological Brother     Diabetes Biological Brother      Past Surgical History:   Procedure Laterality Date    WISDOM TOOTH EXTRACTION       Social History     Tobacco Use    Smoking status: Never    Smokeless tobacco: Never   Substance Use Topics    Alcohol use: Yes     Comment: occ    Drug use: No     Allergies   Allergen Reactions    Acetaminophen     Aspirin      ASPIRIN    Ibuprofen     Penicillins Rash       Objective     Component      Latest Ref Rng & Units 7/22/2022 7/22/2022 7/22/2022           7:09 AM  7:09 AM  7:09 AM   WBC      3.8 - 10.8 Thousand/uL   6.4   RBC      4.20 - 5.80 Million/uL   4.66   Hemoglobin      13.2 - 17.1 g/dL   14.4   Hematocrit      38.5 - 50.0 %   43.3   MCV      80.0 - 100.0 fL   92.9   MCH      27.0 - 33.0 pg   30.9   MCHC      32.0 - 36.0 g/dL   33.3   RDW      11.0 - 15.0 %   11.8   Platelets      140 - 400 Thousand/uL   240   MPV      7.5 - 12.5 fL   10.2   Absolute Neutrophils      1,500 - 7,800 cells/uL   3,930   Absolute Lymphocytes      850 - 3,900 cells/uL   1,523   Absolute Monocytes      200 - 950 cells/uL   704   Absolute Eosinophils      15 - 500 cells/uL   211   Absolute Basophils      0 - 200 cells/uL   32   Neutrophils      %   61.4   Lymphocytes      %   23.8   Monocytes      %   11.0   Eosinophils      %   3.3   Basophils      %   0.5   Glucose      65 - 99 mg/dL 111 (H)     BUN      7 - 25 mg/dL 13     Creatinine      0.70 - 1.30 mg/dL 0.82     eGFR      > OR = 60 mL/min/1.73m2 104     Bun/Creatinine Ratio      6 - 22 (calc) NOT APPLICABLE     Sodium      135 - 146 mmol/L 140     Potassium      3.5 - 5.3 mmol/L 4.6      Chloride      98 - 110 mmol/L 103     CO2      20 - 32 mmol/L 29     Calcium      8.6 - 10.3 mg/dL 9.3     Total Protein      6.1 - 8.1 g/dL 7.2     Albumin      3.6 - 5.1 g/dL 4.4     Globulin      1.9 - 3.7 g/dL (calc) 2.8     ALBUMIN/GLOBULIN RATIO      1.0 - 2.5 (calc) 1.6     Bilirubin, Total      0.2 - 1.2 mg/dL 0.6     Alkaline Phosphatase      35 - 144 U/L 58     AST (SGOT)      10 - 35 U/L 26     ALT (SGPT)      9 - 46 U/L 31     Color, Urine      YELLOW      Clarity, Urine      CLEAR      Specific Gravity, Urine      1.001 - 1.035      pH, Urine      5.0 - 8.0      Glucose, Urine      NEGATIVE      Bilirubin, Urine      NEGATIVE      Ketones, Urine      NEGATIVE      Blood, Urine      NEGATIVE      Protein, Urine      NEGATIVE      Nitrite      NEGATIVE      Leukocyte Esterase      NEGATIVE      WBC (UA)      < OR = 5 /HPF      RBC (UA)      < OR = 2 /HPF      SQUAMOUS EPITHELIAL CELLS (UA)      < OR = 5 /HPF      Bacteria (UA)      NONE SEEN /HPF      HYALINE CAST (UA)      NONE SEEN /LPF      Cholesterol      <200 mg/dL  120    HDL      > OR = 40 mg/dL  56    Triglycerides      <150 mg/dL  64    LDL Calculated      mg/dL (calc)  50    Chol/Hdlc Ratio      <5.0 (calc)  2.1    Non-HDL, Calculated      <130 mg/dL (calc)  64    Creatinine, Urine      20 - 320 mg/dL      Microalb, Ur      See Note: mg/dL      Microalb/Creat Ratio      <30 mcg/mg creat      Psa, Total      < OR = 4.00 ng/mL      Hemoglobin A1C      <5.7 % of total Hgb      Reflexive Urine Culture              Component      Latest Ref Rng & Units 7/22/2022 7/22/2022 7/22/2022           7:09 AM  7:09 AM  7:09 AM   WBC      3.8 - 10.8 Thousand/uL      RBC      4.20 - 5.80 Million/uL      Hemoglobin      13.2 - 17.1 g/dL      Hematocrit      38.5 - 50.0 %      MCV      80.0 - 100.0 fL      MCH      27.0 - 33.0 pg      MCHC      32.0 - 36.0 g/dL      RDW      11.0 - 15.0 %      Platelets      140 - 400 Thousand/uL      MPV      7.5 - 12.5 fL       Absolute Neutrophils      1,500 - 7,800 cells/uL      Absolute Lymphocytes      850 - 3,900 cells/uL      Absolute Monocytes      200 - 950 cells/uL      Absolute Eosinophils      15 - 500 cells/uL      Absolute Basophils      0 - 200 cells/uL      Neutrophils      %      Lymphocytes      %      Monocytes      %      Eosinophils      %      Basophils      %      Glucose      65 - 99 mg/dL      BUN      7 - 25 mg/dL      Creatinine      0.70 - 1.30 mg/dL      eGFR      > OR = 60 mL/min/1.73m2      Bun/Creatinine Ratio      6 - 22 (calc)      Sodium      135 - 146 mmol/L      Potassium      3.5 - 5.3 mmol/L      Chloride      98 - 110 mmol/L      CO2      20 - 32 mmol/L      Calcium      8.6 - 10.3 mg/dL      Total Protein      6.1 - 8.1 g/dL      Albumin      3.6 - 5.1 g/dL      Globulin      1.9 - 3.7 g/dL (calc)      ALBUMIN/GLOBULIN RATIO      1.0 - 2.5 (calc)      Bilirubin, Total      0.2 - 1.2 mg/dL      Alkaline Phosphatase      35 - 144 U/L      AST (SGOT)      10 - 35 U/L      ALT (SGPT)      9 - 46 U/L      Color, Urine      YELLOW  YELLOW    Clarity, Urine      CLEAR  CLEAR    Specific Gravity, Urine      1.001 - 1.035  1.020    pH, Urine      5.0 - 8.0  5.5    Glucose, Urine      NEGATIVE  NEGATIVE    Bilirubin, Urine      NEGATIVE  NEGATIVE    Ketones, Urine      NEGATIVE  NEGATIVE    Blood, Urine      NEGATIVE  NEGATIVE    Protein, Urine      NEGATIVE  NEGATIVE    Nitrite      NEGATIVE  NEGATIVE    Leukocyte Esterase      NEGATIVE  NEGATIVE    WBC (UA)      < OR = 5 /HPF  NONE SEEN    RBC (UA)      < OR = 2 /HPF  NONE SEEN    SQUAMOUS EPITHELIAL CELLS (UA)      < OR = 5 /HPF  NONE SEEN    Bacteria (UA)      NONE SEEN /HPF  NONE SEEN    HYALINE CAST (UA)      NONE SEEN /LPF  NONE SEEN    Cholesterol      <200 mg/dL      HDL      > OR = 40 mg/dL      Triglycerides      <150 mg/dL      LDL Calculated      mg/dL (calc)      Chol/Hdlc Ratio      <5.0 (calc)      Non-HDL, Calculated      <130 mg/dL (calc)       Creatinine, Urine      20 - 320 mg/dL      Microalb, Ur      See Note: mg/dL      Microalb/Creat Ratio      <30 mcg/mg creat      Psa, Total      < OR = 4.00 ng/mL 0.52     Hemoglobin A1C      <5.7 % of total Hgb   6.1 (H)   Reflexive Urine Culture              Component      Latest Ref Rng & Units 7/22/2022 7/22/2022           7:09 AM  7:09 AM   WBC      3.8 - 10.8 Thousand/uL     RBC      4.20 - 5.80 Million/uL     Hemoglobin      13.2 - 17.1 g/dL     Hematocrit      38.5 - 50.0 %     MCV      80.0 - 100.0 fL     MCH      27.0 - 33.0 pg     MCHC      32.0 - 36.0 g/dL     RDW      11.0 - 15.0 %     Platelets      140 - 400 Thousand/uL     MPV      7.5 - 12.5 fL     Absolute Neutrophils      1,500 - 7,800 cells/uL     Absolute Lymphocytes      850 - 3,900 cells/uL     Absolute Monocytes      200 - 950 cells/uL     Absolute Eosinophils      15 - 500 cells/uL     Absolute Basophils      0 - 200 cells/uL     Neutrophils      %     Lymphocytes      %     Monocytes      %     Eosinophils      %     Basophils      %     Glucose      65 - 99 mg/dL     BUN      7 - 25 mg/dL     Creatinine      0.70 - 1.30 mg/dL     eGFR      > OR = 60 mL/min/1.73m2     Bun/Creatinine Ratio      6 - 22 (calc)     Sodium      135 - 146 mmol/L     Potassium      3.5 - 5.3 mmol/L     Chloride      98 - 110 mmol/L     CO2      20 - 32 mmol/L     Calcium      8.6 - 10.3 mg/dL     Total Protein      6.1 - 8.1 g/dL     Albumin      3.6 - 5.1 g/dL     Globulin      1.9 - 3.7 g/dL (calc)     ALBUMIN/GLOBULIN RATIO      1.0 - 2.5 (calc)     Bilirubin, Total      0.2 - 1.2 mg/dL     Alkaline Phosphatase      35 - 144 U/L     AST (SGOT)      10 - 35 U/L     ALT (SGPT)      9 - 46 U/L     Color, Urine      YELLOW     Clarity, Urine      CLEAR     Specific Gravity, Urine      1.001 - 1.035     pH, Urine      5.0 - 8.0     Glucose, Urine      NEGATIVE     Bilirubin, Urine      NEGATIVE     Ketones, Urine      NEGATIVE     Blood, Urine      NEGATIVE    "  Protein, Urine      NEGATIVE     Nitrite      NEGATIVE     Leukocyte Esterase      NEGATIVE     WBC (UA)      < OR = 5 /HPF     RBC (UA)      < OR = 2 /HPF     SQUAMOUS EPITHELIAL CELLS (UA)      < OR = 5 /HPF     Bacteria (UA)      NONE SEEN /HPF     HYALINE CAST (UA)      NONE SEEN /LPF     Cholesterol      <200 mg/dL     HDL      > OR = 40 mg/dL     Triglycerides      <150 mg/dL     LDL Calculated      mg/dL (calc)     Chol/Hdlc Ratio      <5.0 (calc)     Non-HDL, Calculated      <130 mg/dL (calc)     Creatinine, Urine      20 - 320 mg/dL 181    Microalb, Ur      See Note: mg/dL 2.5    Microalb/Creat Ratio      <30 mcg/mg creat 14    Psa, Total      < OR = 4.00 ng/mL     Hemoglobin A1C      <5.7 % of total Hgb     Reflexive Urine Culture        SEE COMMENT       Vitals:    10/26/22 0803   BP: 120/82   BP Location: Left upper arm   Patient Position: Sitting   Pulse: (!) 104   Resp: 16   Temp: 36.8 °C (98.2 °F)   TempSrc: Temporal   SpO2: 99%   Weight: 82.6 kg (182 lb)   Height: 1.778 m (5' 10\")         Physical Exam  Vitals and nursing note reviewed.   Constitutional:       Appearance: He is well-developed.   HENT:      Head: Normocephalic and atraumatic.      Right Ear: Tympanic membrane normal.      Left Ear: Tympanic membrane normal.   Eyes:      Extraocular Movements: Extraocular movements intact.      Conjunctiva/sclera: Conjunctivae normal.      Pupils: Pupils are equal, round, and reactive to light.   Cardiovascular:      Rate and Rhythm: Normal rate and regular rhythm.      Heart sounds: Normal heart sounds.   Pulmonary:      Effort: Pulmonary effort is normal. No respiratory distress.      Breath sounds: Normal breath sounds.   Abdominal:      General: Bowel sounds are normal. There is no distension.      Palpations: Abdomen is soft.      Tenderness: There is no abdominal tenderness.   Musculoskeletal:         General: Normal range of motion.      Cervical back: Normal range of motion and neck supple. "   Skin:     General: Skin is warm and dry.   Neurological:      Mental Status: He is alert and oriented to person, place, and time.         Assessment/Plan   Problem List Items Addressed This Visit        Circulatory    Essential hypertension    Current Assessment & Plan     Continue on current regimen         Relevant Medications    losartan (COZAAR) 25 mg tablet    Other Relevant Orders    Comprehensive metabolic panel    Microalbumin/Creatinine Ur Random       Endocrine/Metabolic    Type 2 diabetes mellitus without complication, without long-term current use of insulin (CMS/[facility])    Current Assessment & Plan     Recommend low carb diet, moderate exercise and weight loss. Decrease intake of bread, rice, pasta, starches, juice, soda and sweets.         Relevant Medications    metFORMIN XR (GLUCOPHAGE-XR) 500 mg 24 hr tablet    Other Relevant Orders    Hemoglobin A1c    Comprehensive metabolic panel    Microalbumin/Creatinine Ur Random       Other    Hypercholesterolemia    Current Assessment & Plan     Continue on statin therapy         Relevant Medications    atorvastatin (LIPITOR) 10 mg tablet    Other Relevant Orders    Comprehensive metabolic panel    Annual physical exam - Primary    Current Assessment & Plan     Recommend well balanced diet with plenty of fruits and vegetables, whole grains, lean proteins and low fat dairy. Recommend an exercise program - goal should be at least 150 minutes/week of moderate exercise.         Other Visit Diagnoses     Encounter for colorectal cancer screening        Relevant Orders    Scan Only Cologuard (Completed)    Need for Tdap vaccination        Relevant Orders    Tdap vaccine greater than or equal to 8yo IM (Completed)          Isaias Burr MD  10/26/2022

## 2022-10-26 NOTE — ASSESSMENT & PLAN NOTE
Recommend well balanced diet with plenty of fruits and vegetables, whole grains, lean proteins and low fat dairy. Recommend an exercise program - goal should be at least 150 minutes/week of moderate exercise.

## 2022-12-07 ENCOUNTER — TELEPHONE (OUTPATIENT)
Dept: FAMILY MEDICINE | Facility: CLINIC | Age: 57
End: 2022-12-07
Payer: COMMERCIAL

## 2022-12-07 NOTE — TELEPHONE ENCOUNTER
C/o pain in groin area.  Started Monday night and has gotten progressively worse.  Woke him up during the night.   Comes and goes.  Not testicles.      No fevers, has been having BM.  No trouble urinating.  Some mild pain on the left side where his hip is.  Hurts most when laying down, sitting hurts less.  Doesn't bother him to  objects.       Please advise when he should be seen

## 2022-12-07 NOTE — TELEPHONE ENCOUNTER
Please put patient in for 1120 tomorrow per dr ernandez   it will be a double book   patient aware

## 2022-12-07 NOTE — TELEPHONE ENCOUNTER
[facility] Appointment Request   Provider: Leno   Appointment Type: SDS/OV  Reason for Visit: Abdominal Pain  Available Day and Time: Anytime  Best Contact Number: [phone]    The practice will reach out to schedule your appointment within the next 2 business days.

## 2022-12-07 NOTE — TELEPHONE ENCOUNTER
We can schedule him at 11:20 tomorrow.  Tell him to get here at least 5-10 minutes early because we are fitting him in to my schedule

## 2022-12-08 ENCOUNTER — APPOINTMENT (OUTPATIENT)
Dept: LAB | Age: 57
End: 2022-12-08
Attending: FAMILY MEDICINE
Payer: COMMERCIAL

## 2022-12-08 ENCOUNTER — OFFICE VISIT (OUTPATIENT)
Dept: FAMILY MEDICINE | Facility: CLINIC | Age: 57
End: 2022-12-08
Payer: COMMERCIAL

## 2022-12-08 VITALS
OXYGEN SATURATION: 98 % | DIASTOLIC BLOOD PRESSURE: 78 MMHG | WEIGHT: 184 LBS | RESPIRATION RATE: 16 BRPM | HEART RATE: 96 BPM | SYSTOLIC BLOOD PRESSURE: 120 MMHG | BODY MASS INDEX: 26.34 KG/M2 | HEIGHT: 70 IN | TEMPERATURE: 97.8 F

## 2022-12-08 DIAGNOSIS — R10.32 LLQ ABDOMINAL PAIN: Primary | ICD-10-CM

## 2022-12-08 DIAGNOSIS — R10.32 LEFT LOWER QUADRANT PAIN: ICD-10-CM

## 2022-12-08 PROCEDURE — 99213 OFFICE O/P EST LOW 20 MIN: CPT | Performed by: FAMILY MEDICINE

## 2022-12-08 PROCEDURE — 30099997 SPECIMEN PROCESSING

## 2022-12-08 PROCEDURE — 3008F BODY MASS INDEX DOCD: CPT | Performed by: FAMILY MEDICINE

## 2022-12-08 PROCEDURE — 3078F DIAST BP <80 MM HG: CPT | Performed by: FAMILY MEDICINE

## 2022-12-08 PROCEDURE — 3074F SYST BP LT 130 MM HG: CPT | Performed by: FAMILY MEDICINE

## 2022-12-08 ASSESSMENT — ENCOUNTER SYMPTOMS
NAUSEA: 0
FEVER: 0
CONSTIPATION: 0
VOMITING: 0
CHILLS: 0
ABDOMINAL PAIN: 1
DIARRHEA: 0

## 2022-12-08 NOTE — PROGRESS NOTES
Daily Progress Note       Patient ID: Samuel Patricia is a 57 y.o. male.    HPI    57 year old male presents with lower abdominal pain.     On Monday night after diner he felt lower abdominal pain.  Got worsen overnight into Wednesday.  Waxing and waning.  Prolonged sitting and laying aggravated the pain.  Standing and movement helped.  No urinary symptoms.  No nausea or vomiting.  No fever or chills.  No diarrhea, constipation or change in bowel movements.  Hx of colonoscopy in 2016     Colonoscopy done in October, 2016 which showed multiple small and large mouthed diverticula in the sigmoid colon, descending colon, transverse colon and ascending colon.    The following have been reviewed and updated as appropriate in this visit:   Allergies  Meds  Problems       Review of Systems   Constitutional: Negative for chills and fever.   Gastrointestinal: Positive for abdominal pain. Negative for constipation, diarrhea, nausea and vomiting.             Current Outpatient Medications   Medication Sig Dispense Refill   • atorvastatin (LIPITOR) 10 mg tablet Take 1 tablet (10 mg total) by mouth nightly. 90 tablet 0   • blood-glucose meter misc test up to once daily in am before breakfast only.     • CONTOUR NEXT TEST STRIPS strip TEST BLOOD SUGAR ONCE DAILY 100 strip 1   • fexofenadine (ALLEGRA) 180 mg tablet 180 mg.     • losartan (COZAAR) 25 mg tablet Take 1 tablet (25 mg total) by mouth daily. 90 tablet 0   • metFORMIN XR (GLUCOPHAGE-XR) 500 mg 24 hr tablet TAKE 2 TABLETS 2 TIMES     DAILY WITH MEALS 360 tablet 0     No current facility-administered medications for this visit.     Past Medical History:   Diagnosis Date   • Hx of gout      Family History   Problem Relation Age of Onset   • Stroke Biological Mother    • Stroke Biological Father    • Diabetes Biological Father    • Diabetes Biological Brother    • Diabetes Biological Brother      Past Surgical History:   Procedure Laterality Date   • WISDOM TOOTH  "EXTRACTION       Social History     Tobacco Use   • Smoking status: Never   • Smokeless tobacco: Never   Vaping Use   • Vaping Use: Never used   Substance Use Topics   • Alcohol use: Yes     Comment: occ   • Drug use: No     Allergies   Allergen Reactions   • Acetaminophen    • Aspirin      ASPIRIN   • Ibuprofen    • Penicillins Rash       Objective   No new labs.    Vitals:    12/08/22 1116   BP: 120/78   BP Location: Left upper arm   Patient Position: Sitting   Pulse: 96   Resp: 16   Temp: 36.6 °C (97.8 °F)   TempSrc: Temporal   SpO2: 98%   Weight: 83.5 kg (184 lb)   Height: 1.778 m (5' 10\")         Physical Exam  Vitals and nursing note reviewed.   Constitutional:       Appearance: Normal appearance. He is well-developed.   HENT:      Head: Normocephalic and atraumatic.   Eyes:      Extraocular Movements: Extraocular movements intact.      Conjunctiva/sclera: Conjunctivae normal.   Pulmonary:      Effort: Pulmonary effort is normal.   Abdominal:      Tenderness: There is abdominal tenderness in the left lower quadrant. There is no guarding or rebound.   Musculoskeletal:         General: Normal range of motion.      Cervical back: Normal range of motion.   Skin:     General: Skin is warm and dry.   Neurological:      General: No focal deficit present.      Mental Status: He is alert and oriented to person, place, and time.   Psychiatric:         Mood and Affect: Mood normal.         Behavior: Behavior normal.         Thought Content: Thought content normal.         Judgment: Judgment normal.         Assessment/Plan   Problem List Items Addressed This Visit    None  Visit Diagnoses     LLQ abdominal pain    -  Primary    Relevant Orders    CBC and Differential    Sedimentation rate    C-reactive protein    Basic metabolic panel      He has left lower quadrant abdominal tenderness with history of diverticulosis in sigmoid, descending, transverse and ascending colon.  Concern for possible diverticulitis.  Will check " CBC and inflammatory markers at this time, he will get these labs done today.  He reports today he has been is better.  Recommended liquid based low residual diet.  We will follow-up with him tomorrow with results and he will update on his symptoms.  ED precautions given.    Isaias Burr MD  12/8/2022

## 2022-12-10 LAB
BASOPHILS # BLD AUTO: 44 CELLS/UL (ref 0–200)
BASOPHILS NFR BLD AUTO: 0.4 %
BUN SERPL-MCNC: 9 MG/DL (ref 7–25)
BUN/CREAT SERPL: ABNORMAL (CALC) (ref 6–22)
CALCIUM SERPL-MCNC: 9.6 MG/DL (ref 8.6–10.3)
CHLORIDE SERPL-SCNC: 99 MMOL/L (ref 98–110)
CO2 SERPL-SCNC: 27 MMOL/L (ref 20–32)
CREAT SERPL-MCNC: 0.85 MG/DL (ref 0.7–1.3)
CRP SERPL-MCNC: 124.7 MG/L
EGFRCR SERPLBLD CKD-EPI 2021: 102 ML/MIN/1.73M2
EOSINOPHIL # BLD AUTO: 88 CELLS/UL (ref 15–500)
EOSINOPHIL NFR BLD AUTO: 0.8 %
ERYTHROCYTE [DISTWIDTH] IN BLOOD BY AUTOMATED COUNT: 11.6 % (ref 11–15)
ERYTHROCYTE [SEDIMENTATION RATE] IN BLOOD BY WESTERGREN METHOD: 6 MM/H
GLUCOSE SERPL-MCNC: 118 MG/DL (ref 65–99)
HCT VFR BLD AUTO: 42.2 % (ref 38.5–50)
HGB BLD-MCNC: 14 G/DL (ref 13.2–17.1)
LYMPHOCYTES # BLD AUTO: 2024 CELLS/UL (ref 850–3900)
LYMPHOCYTES NFR BLD AUTO: 18.4 %
MCH RBC QN AUTO: 31.5 PG (ref 27–33)
MCHC RBC AUTO-ENTMCNC: 33.2 G/DL (ref 32–36)
MCV RBC AUTO: 94.8 FL (ref 80–100)
MONOCYTES # BLD AUTO: 814 CELLS/UL (ref 200–950)
MONOCYTES NFR BLD AUTO: 7.4 %
NEUTROPHILS # BLD AUTO: 8030 CELLS/UL (ref 1500–7800)
NEUTROPHILS NFR BLD AUTO: 73 %
PLATELET # BLD AUTO: 318 THOUSAND/UL (ref 140–400)
PMV BLD REES-ECKER: 10.2 FL (ref 7.5–12.5)
POTASSIUM SERPL-SCNC: 4.4 MMOL/L (ref 3.5–5.3)
RBC # BLD AUTO: 4.45 MILLION/UL (ref 4.2–5.8)
SODIUM SERPL-SCNC: 138 MMOL/L (ref 135–146)
WBC # BLD AUTO: 11 THOUSAND/UL (ref 3.8–10.8)

## 2022-12-12 ENCOUNTER — TELEPHONE (OUTPATIENT)
Dept: FAMILY MEDICINE | Facility: CLINIC | Age: 57
End: 2022-12-12
Payer: COMMERCIAL

## 2022-12-12 NOTE — TELEPHONE ENCOUNTER
Can you get an update on symptoms from patient?  He was seen last Thursday for lower abdominal pain with concern for diverticulitis.  Labs were ordered but did not get resulted till the weekend.  Labs are suggestive of diverticulitis with elevated white blood cell count and elevated C-reactive protein.  If patient still having lower abdominal pain, I would have a low threshold of sending in antibiotics to the pharmacy.

## 2022-12-30 ENCOUNTER — APPOINTMENT (OUTPATIENT)
Dept: LAB | Age: 57
End: 2022-12-30
Attending: FAMILY MEDICINE
Payer: COMMERCIAL

## 2022-12-30 DIAGNOSIS — E78.00 PURE HYPERCHOLESTEROLEMIA, UNSPECIFIED: ICD-10-CM

## 2022-12-30 DIAGNOSIS — I10 ESSENTIAL (PRIMARY) HYPERTENSION: ICD-10-CM

## 2022-12-30 DIAGNOSIS — E11.9 TYPE 2 DIABETES MELLITUS WITHOUT COMPLICATIONS (CMS/HCC): ICD-10-CM

## 2022-12-30 PROCEDURE — 30099997 SPECIMEN PROCESSING

## 2022-12-31 LAB
ALBUMIN SERPL-MCNC: 4.5 G/DL (ref 3.6–5.1)
ALBUMIN/CREAT UR: 13 MCG/MG CREAT
ALBUMIN/GLOB SERPL: 1.5 (CALC) (ref 1–2.5)
ALP SERPL-CCNC: 63 U/L (ref 35–144)
ALT SERPL-CCNC: 31 U/L (ref 9–46)
AST SERPL-CCNC: 36 U/L (ref 10–35)
BILIRUB SERPL-MCNC: 1.1 MG/DL (ref 0.2–1.2)
BUN SERPL-MCNC: 15 MG/DL (ref 7–25)
BUN/CREAT SERPL: ABNORMAL (CALC) (ref 6–22)
CALCIUM SERPL-MCNC: 9.6 MG/DL (ref 8.6–10.3)
CHLORIDE SERPL-SCNC: 103 MMOL/L (ref 98–110)
CO2 SERPL-SCNC: 29 MMOL/L (ref 20–32)
CREAT SERPL-MCNC: 0.85 MG/DL (ref 0.7–1.3)
CREAT UR-MCNC: 359 MG/DL (ref 20–320)
EGFRCR SERPLBLD CKD-EPI 2021: 102 ML/MIN/1.73M2
GLOBULIN SER CALC-MCNC: 3.1 G/DL (CALC) (ref 1.9–3.7)
GLUCOSE SERPL-MCNC: 129 MG/DL (ref 65–99)
HBA1C MFR BLD: 6.5 % OF TOTAL HGB
MICROALBUMIN UR-MCNC: 4.5 MG/DL
POTASSIUM SERPL-SCNC: 4.4 MMOL/L (ref 3.5–5.3)
PROT SERPL-MCNC: 7.6 G/DL (ref 6.1–8.1)
SODIUM SERPL-SCNC: 141 MMOL/L (ref 135–146)

## 2023-01-27 DIAGNOSIS — I10 ESSENTIAL HYPERTENSION: ICD-10-CM

## 2023-01-27 DIAGNOSIS — E78.00 HYPERCHOLESTEROLEMIA: ICD-10-CM

## 2023-01-27 DIAGNOSIS — E11.9 TYPE 2 DIABETES MELLITUS WITHOUT COMPLICATION, WITHOUT LONG-TERM CURRENT USE OF INSULIN (CMS/HCC): ICD-10-CM

## 2023-01-27 RX ORDER — ATORVASTATIN CALCIUM 10 MG/1
10 TABLET, FILM COATED ORAL NIGHTLY
Qty: 90 TABLET | Refills: 1 | Status: SHIPPED | OUTPATIENT
Start: 2023-01-27 | End: 2023-06-07

## 2023-01-27 RX ORDER — METFORMIN HYDROCHLORIDE 500 MG/1
TABLET, EXTENDED RELEASE ORAL
Qty: 360 TABLET | Refills: 1 | Status: SHIPPED | OUTPATIENT
Start: 2023-01-27 | End: 2023-06-07

## 2023-01-27 RX ORDER — LOSARTAN POTASSIUM 25 MG/1
25 TABLET ORAL DAILY
Qty: 90 TABLET | Refills: 1 | Status: SHIPPED | OUTPATIENT
Start: 2023-01-27 | End: 2023-06-07

## 2023-06-07 DIAGNOSIS — E78.00 HYPERCHOLESTEROLEMIA: ICD-10-CM

## 2023-06-07 DIAGNOSIS — I10 ESSENTIAL HYPERTENSION: ICD-10-CM

## 2023-06-07 DIAGNOSIS — E11.9 TYPE 2 DIABETES MELLITUS WITHOUT COMPLICATION, WITHOUT LONG-TERM CURRENT USE OF INSULIN (CMS/HCC): ICD-10-CM

## 2023-06-07 RX ORDER — METFORMIN HYDROCHLORIDE 500 MG/1
TABLET, EXTENDED RELEASE ORAL
Qty: 360 TABLET | Refills: 1 | Status: SHIPPED | OUTPATIENT
Start: 2023-06-07 | End: 2023-10-27 | Stop reason: SDUPTHER

## 2023-06-07 RX ORDER — LOSARTAN POTASSIUM 25 MG/1
25 TABLET ORAL DAILY
Qty: 90 TABLET | Refills: 1 | Status: SHIPPED | OUTPATIENT
Start: 2023-06-07 | End: 2023-10-27 | Stop reason: SDUPTHER

## 2023-06-07 RX ORDER — ATORVASTATIN CALCIUM 10 MG/1
10 TABLET, FILM COATED ORAL NIGHTLY
Qty: 90 TABLET | Refills: 1 | Status: SHIPPED | OUTPATIENT
Start: 2023-06-07 | End: 2023-10-27 | Stop reason: SDUPTHER

## 2023-10-02 NOTE — ASSESSMENT & PLAN NOTE
Conservative management with rest, ibuprofen versus Tylenol as needed, consider chiropractic.  If not improved within 2 weeks call for x-ray orders.   Patient is a 48 year old female who presents to the Urgent Care for a concern of right pink finger pain and swelling after sustaining a puncture wound from a ara thorn yesterday.  She states that it was deep enough to break off the thorn in her finger, and she was able to remove the thorn.  She used ice and soaked the wound.  It continued to be swollen and painful today.  Since it's right on the knuckle she was worried if she needed something more.    History provided by patient      Past Medical History:   Diagnosis Date   • BMI 45.0-49.9, adult (CMD)    • Calcaneal spur of foot, left    • COVID-19 11/23/2021    Detected   • DVT (deep venous thrombosis) (CMD)    • Essential (primary) hypertension    • Insomnia    • PONV (postoperative nausea and vomiting)    • Wears contact lenses    • Wears glasses      Patient Active Problem List   Diagnosis   • Depression   • Obesity, morbid, BMI 40.0-49.9 (CMD)   • Primary hypertension   • Insomnia   • Anxiety   • Major depression, single episode   • Tension-type headache   • Vitamin D deficiency   • DVT, lower extremity, distal, acute, left (CMD)       Current Outpatient Medications   Medication Sig Dispense Refill   • amoxicillin-clavulanate (AUGMENTIN) 875-125 MG per tablet Take 1 tablet by mouth in the morning and 1 tablet in the evening. Do all this for 7 days. 14 tablet 0   • fluconazole (Diflucan) 150 MG tablet Take 1 tablet by mouth 1 time for 1 dose. Take at end of antibiotics 1 tablet 0   • traZODone (DESYREL) 100 MG tablet Take 1 tablet by mouth nightly. 90 tablet 3   • losartan (COZAAR) 50 MG tablet Take 1 tablet by mouth daily. 90 tablet 3   • diphenhydrAMINE HCl, Sleep, (ZZZQUIL PO) Take by mouth nightly as needed. Insomnia     • Levonorgestrel (MIRENA IU) Inserted 10/9/15       No current facility-administered medications for this visit.     ALLERGIES:  No Known Allergies     Review of Systems:  Per HPI and any further below  Eye Problem(s):negative  ENT  Problem(s):negative  Cardiovascular problem(s):negative  Respiratory problem(s):negative  Gastro-intestinal problem(s):negative GI  Genito-urinary problem(s):negative  Musculoskeletal problem(s):finger pain and swelling as above  Integumentary problem(s):negative  Neurological problem(s):negative  Psychiatric problem(s):negative  Endocrine problem(s):negative  Hematologic and/or Lymphatic problem(s):negative      Physical Exam: VSS   Mild BP elevated  Visit Vitals  BP (!) 146/93   Pulse 79   Temp 97 °F (36.1 °C) (Temporal)   SpO2 99%        General:   The patient appears stated age without distress.  Skin:  Clean, Dry, Warm without rash.  The right pinky finger has a small puncture on the proximal PIP with pink and swelling.  Head:  Normocephalic, atraumatic.   Eyes:  PERRL. Normal conjunctivae and sclerae.   Ears: EAC patent without drainage.  Neurologic:  Oriented x3.  No focal deficits.    Musculoskeletal:  Strength and tone appropriate without obvious ambulatory deficits  Right pinky has joint effusion at the proximal PIP, ROM limited due to pain and swelling.  Distal to wound is without abnormality.  Extremities: No cyanosis, clubbing or edema  Psychiatric:  Cooperative.  Appropriate mood and affect.     Cellulitis of finger of right hand  (primary encounter diagnosis)  Puncture wound of finger, initial encounter     Orders Placed This Encounter   • amoxicillin-clavulanate (AUGMENTIN) 875-125 MG per tablet   • fluconazole (Diflucan) 150 MG tablet        PLAN of CARE:     Skin wound is very mild, but the location is concerning for being on the PIP with effusion and poor ROM.  Will start antibiotics, but would cannot be explored or irritated.  If not better, patient is advised to contact PCP for further evaluation.  Note offered, patient deferred.  Splint for comfort.    Patient Instructions   Cellulitis - with possible injury into the \"Knuckle\" joint    Augmentin - antibiotic, Rx sent  - Probiotic - eating  yogurt or taking supplement to help body tolerate medication  - Diflucan - for yeast, due to history of secondary yeast infections with antibiotics    Splint to finger for comfort, wear as needed  - Avoid gripping/grasping with the right hand or submerging wound (Avoid yard work and dishes)    Contact PCP if not better:  - Due to placement of the injury, a hand surgeon may need to be consulted if not better with treatment above.  - Exploration for possible retained foreign body or intraarticular infection may need to be evaluated.

## 2023-10-04 ENCOUNTER — TELEPHONE (OUTPATIENT)
Dept: FAMILY MEDICINE | Facility: CLINIC | Age: 58
End: 2023-10-04
Payer: COMMERCIAL

## 2023-10-04 DIAGNOSIS — Z00.00 ANNUAL PHYSICAL EXAM: Primary | ICD-10-CM

## 2023-10-04 DIAGNOSIS — I10 ESSENTIAL HYPERTENSION: ICD-10-CM

## 2023-10-04 DIAGNOSIS — E11.9 TYPE 2 DIABETES MELLITUS WITHOUT COMPLICATION, WITHOUT LONG-TERM CURRENT USE OF INSULIN (CMS/HCC): ICD-10-CM

## 2023-10-04 DIAGNOSIS — E78.00 HYPERCHOLESTEROLEMIA: ICD-10-CM

## 2023-10-21 LAB
ALBUMIN SERPL-MCNC: 4.3 G/DL (ref 3.6–5.1)
ALBUMIN/CREAT UR: 11 MCG/MG CREAT
ALBUMIN/GLOB SERPL: 1.4 (CALC) (ref 1–2.5)
ALP SERPL-CCNC: 53 U/L (ref 35–144)
ALT SERPL-CCNC: 33 U/L (ref 9–46)
AST SERPL-CCNC: 30 U/L (ref 10–35)
BASOPHILS # BLD AUTO: 41 CELLS/UL (ref 0–200)
BASOPHILS NFR BLD AUTO: 0.6 %
BILIRUB SERPL-MCNC: 0.6 MG/DL (ref 0.2–1.2)
BUN SERPL-MCNC: 11 MG/DL (ref 7–25)
BUN/CREAT SERPL: 16 (CALC) (ref 6–22)
CALCIUM SERPL-MCNC: 9.2 MG/DL (ref 8.6–10.3)
CHLORIDE SERPL-SCNC: 104 MMOL/L (ref 98–110)
CHOLEST SERPL-MCNC: 126 MG/DL
CHOLEST/HDLC SERPL: 2.4 (CALC)
CO2 SERPL-SCNC: 26 MMOL/L (ref 20–32)
CREAT SERPL-MCNC: 0.69 MG/DL (ref 0.7–1.3)
CREAT UR-MCNC: 129 MG/DL (ref 20–320)
EGFRCR SERPLBLD CKD-EPI 2021: 108 ML/MIN/1.73M2
EOSINOPHIL # BLD AUTO: 218 CELLS/UL (ref 15–500)
EOSINOPHIL NFR BLD AUTO: 3.2 %
ERYTHROCYTE [DISTWIDTH] IN BLOOD BY AUTOMATED COUNT: 12.1 % (ref 11–15)
GLOBULIN SER CALC-MCNC: 3 G/DL (CALC) (ref 1.9–3.7)
GLUCOSE SERPL-MCNC: 117 MG/DL (ref 65–99)
HBA1C MFR BLD: 5.8 % OF TOTAL HGB
HCT VFR BLD AUTO: 41.8 % (ref 38.5–50)
HDLC SERPL-MCNC: 52 MG/DL
HGB BLD-MCNC: 14 G/DL (ref 13.2–17.1)
LDLC SERPL CALC-MCNC: 56 MG/DL (CALC)
LYMPHOCYTES # BLD AUTO: 1408 CELLS/UL (ref 850–3900)
LYMPHOCYTES NFR BLD AUTO: 20.7 %
MCH RBC QN AUTO: 31.1 PG (ref 27–33)
MCHC RBC AUTO-ENTMCNC: 33.5 G/DL (ref 32–36)
MCV RBC AUTO: 92.9 FL (ref 80–100)
MICROALBUMIN UR-MCNC: 1.4 MG/DL
MONOCYTES # BLD AUTO: 755 CELLS/UL (ref 200–950)
MONOCYTES NFR BLD AUTO: 11.1 %
NEUTROPHILS # BLD AUTO: 4379 CELLS/UL (ref 1500–7800)
NEUTROPHILS NFR BLD AUTO: 64.4 %
NONHDLC SERPL-MCNC: 74 MG/DL (CALC)
PLATELET # BLD AUTO: 227 THOUSAND/UL (ref 140–400)
PMV BLD REES-ECKER: 10.5 FL (ref 7.5–12.5)
POTASSIUM SERPL-SCNC: 4.2 MMOL/L (ref 3.5–5.3)
PROT SERPL-MCNC: 7.3 G/DL (ref 6.1–8.1)
PSA SERPL-MCNC: 0.45 NG/ML
RBC # BLD AUTO: 4.5 MILLION/UL (ref 4.2–5.8)
SODIUM SERPL-SCNC: 139 MMOL/L (ref 135–146)
TRIGL SERPL-MCNC: 93 MG/DL
TSH SERPL-ACNC: 3.22 MIU/L (ref 0.4–4.5)
WBC # BLD AUTO: 6.8 THOUSAND/UL (ref 3.8–10.8)

## 2023-10-27 ENCOUNTER — OFFICE VISIT (OUTPATIENT)
Dept: FAMILY MEDICINE | Facility: CLINIC | Age: 58
End: 2023-10-27
Payer: COMMERCIAL

## 2023-10-27 VITALS
DIASTOLIC BLOOD PRESSURE: 80 MMHG | HEIGHT: 70 IN | SYSTOLIC BLOOD PRESSURE: 120 MMHG | BODY MASS INDEX: 25.34 KG/M2 | RESPIRATION RATE: 16 BRPM | OXYGEN SATURATION: 98 % | WEIGHT: 177 LBS | HEART RATE: 97 BPM | TEMPERATURE: 98.2 F

## 2023-10-27 DIAGNOSIS — Z00.00 ANNUAL PHYSICAL EXAM: Primary | ICD-10-CM

## 2023-10-27 DIAGNOSIS — E78.00 HYPERCHOLESTEROLEMIA: ICD-10-CM

## 2023-10-27 DIAGNOSIS — I10 ESSENTIAL HYPERTENSION: ICD-10-CM

## 2023-10-27 DIAGNOSIS — E11.9 TYPE 2 DIABETES MELLITUS WITHOUT COMPLICATION, WITHOUT LONG-TERM CURRENT USE OF INSULIN (CMS/HCC): ICD-10-CM

## 2023-10-27 PROCEDURE — 3079F DIAST BP 80-89 MM HG: CPT | Performed by: FAMILY MEDICINE

## 2023-10-27 PROCEDURE — 3008F BODY MASS INDEX DOCD: CPT | Performed by: FAMILY MEDICINE

## 2023-10-27 PROCEDURE — 99396 PREV VISIT EST AGE 40-64: CPT | Performed by: FAMILY MEDICINE

## 2023-10-27 PROCEDURE — 3074F SYST BP LT 130 MM HG: CPT | Performed by: FAMILY MEDICINE

## 2023-10-27 RX ORDER — LOSARTAN POTASSIUM 25 MG/1
25 TABLET ORAL DAILY
Qty: 90 TABLET | Refills: 1
Start: 2023-10-27 | End: 2023-11-14

## 2023-10-27 RX ORDER — ATORVASTATIN CALCIUM 10 MG/1
10 TABLET, FILM COATED ORAL NIGHTLY
Qty: 90 TABLET | Refills: 1
Start: 2023-10-27 | End: 2024-02-02

## 2023-10-27 RX ORDER — METFORMIN HYDROCHLORIDE 500 MG/1
1000 TABLET, EXTENDED RELEASE ORAL 2 TIMES DAILY WITH MEALS
Qty: 360 TABLET | Refills: 1
Start: 2023-10-27 | End: 2023-10-27 | Stop reason: SDUPTHER

## 2023-10-27 RX ORDER — METFORMIN HYDROCHLORIDE 500 MG/1
500 TABLET, EXTENDED RELEASE ORAL 2 TIMES DAILY WITH MEALS
Qty: 360 TABLET | Refills: 1
Start: 2023-10-27 | End: 2024-05-24

## 2023-10-27 ASSESSMENT — ENCOUNTER SYMPTOMS
ABDOMINAL PAIN: 0
FATIGUE: 0
TROUBLE SWALLOWING: 0
LIGHT-HEADEDNESS: 0
FEVER: 0
DIZZINESS: 0
SHORTNESS OF BREATH: 0
CONSTIPATION: 0
DIARRHEA: 1
NAUSEA: 0
COUGH: 0
VOMITING: 0
PALPITATIONS: 0

## 2023-10-27 ASSESSMENT — PATIENT HEALTH QUESTIONNAIRE - PHQ9: SUM OF ALL RESPONSES TO PHQ9 QUESTIONS 1 & 2: 0

## 2023-10-27 NOTE — PROGRESS NOTES
Daily Progress Note       Patient ID: Samuel Patricia is a 58 y.o. male.    HPI    58-year-old male presents for annual physical exam.     He is  with 3 children - 2 daughters and 1 son (twins).     He is a [employer].      CT heart coronary calcium score done on 7/17/2020 with a score of 6    He has been exercising daily, more on the weekend.     Wt Readings from Last 3 Encounters:   10/27/23 80.3 kg (177 lb)   12/08/22 83.5 kg (184 lb)   10/26/22 82.6 kg (182 lb)        DM2 - currently on Metformin ER 1000 mg twice daily.  Last diabetic eye exam about 3 weeks ago       Essential hypertension - currently on losartan 25 mg daily     Hypercholesterolemia - currently on atorvastatin 10 mg nightly      Colonoscopy-10/24/2016, repeat in 5 to 10 years; Cologuard Negative on 11/4/2022     COVID-19-[employer]-3/28/2021, 4/19/2021, 10/22/2021, 4/22/2022; 10/22/2022; 10/16/2023     Influenza - 10/16/2023     Shingrix-12/11/2019, 2/20/2020     Last dental exam - every 6 months     The following have been reviewed and updated as appropriate in this visit:   Tobacco  Allergies  Meds  Problems  Med Hx  Surg Hx  Fam Hx       Review of Systems   Constitutional: Negative for fatigue and fever.   HENT: Negative for trouble swallowing.    Respiratory: Negative for cough and shortness of breath.    Cardiovascular: Negative for chest pain and palpitations.   Gastrointestinal: Positive for diarrhea. Negative for abdominal pain, constipation, nausea and vomiting.   Neurological: Negative for dizziness, syncope and light-headedness.   All other systems reviewed and are negative.            Current Outpatient Medications   Medication Sig Dispense Refill    atorvastatin (LIPITOR) 10 mg tablet Take 1 tablet (10 mg total) by mouth nightly. 90 tablet 1    blood-glucose meter misc test up to once daily in am before breakfast only.      CONTOUR NEXT TEST STRIPS strip TEST BLOOD SUGAR ONCE DAILY 100 strip 1    fexofenadine (ALLEGRA) 180  mg tablet 180 mg.      losartan (COZAAR) 25 mg tablet Take 1 tablet (25 mg total) by mouth daily. 90 tablet 1    metFORMIN XR (GLUCOPHAGE-XR) 500 mg 24 hr tablet Take 1 tablet (500 mg total) by mouth 2 (two) times a day with meals. 360 tablet 1     No current facility-administered medications for this visit.     Past Medical History:   Diagnosis Date    Hx of gout      Family History   Problem Relation Age of Onset    Stroke Biological Mother     Stroke Biological Father     Diabetes Biological Father     Diabetes Biological Brother     Diabetes Biological Brother      Past Surgical History:   Procedure Laterality Date    WISDOM TOOTH EXTRACTION       Social History     Tobacco Use    Smoking status: Never    Smokeless tobacco: Never   Vaping Use    Vaping Use: Never used   Substance Use Topics    Alcohol use: Yes     Comment: occ    Drug use: No     Allergies   Allergen Reactions    Acetaminophen     Aspirin      ASPIRIN    Ibuprofen     Penicillins Rash       Objective     Component      [facility]20[facility]   WBC      3.8 - 10.8 Thousand/uL 6.8    RBC      4.20 - 5.80 Million/uL 4.50    Hemoglobin      13.2 - 17.1 g/dL 14.0    Hematocrit      38.5 - 50.0 % 41.8    MCV      80.0 - 100.0 fL 92.9    MCH      27.0 - 33.0 pg 31.1    MCHC      32.0 - 36.0 g/dL 33.5    RDW      11.0 - 15.0 % 12.1    Platelets      140 - 400 Thousand/uL 227    MPV      7.5 - 12.5 fL 10.5    Absolute Neutrophils      1,500 - 7,800 cells/uL 4,379    Absolute Lymphocytes      850 - 3,900 cells/uL 1,408    Absolute Monocytes      200 - 950 cells/uL 755    Absolute Eosinophils      15 - 500 cells/uL 218    Absolute Basophils      0 - 200 cells/uL 41    Neutrophils      % 64.4    Lymphocytes      % 20.7    Monocytes      % 11.1    Eosinophils      % 3.2    Basophils      % 0.6    Glucose      65 - 99 mg/dL 117 (H)    BUN      7 - 25 mg/dL 11    Creatinine      0.70 - 1.30 mg/dL 0.69 (L)    eGFR      > OR = 60  "mL/min/1.73m2 108    Bun/Creatinine Ratio      6 - 22 (calc) 16    Sodium      135 - 146 mmol/L 139    Potassium      3.5 - 5.3 mmol/L 4.2    Chloride      98 - 110 mmol/L 104    CO2      20 - 32 mmol/L 26    Calcium      8.6 - 10.3 mg/dL 9.2    Total Protein      6.1 - 8.1 g/dL 7.3    Albumin      3.6 - 5.1 g/dL 4.3    Globulin      1.9 - 3.7 g/dL (calc) 3.0    ALBUMIN/GLOBULIN RATIO      1.0 - 2.5 (calc) 1.4    Bilirubin, Total      0.2 - 1.2 mg/dL 0.6    Alkaline Phosphatase      35 - 144 U/L 53    AST (SGOT)      10 - 35 U/L 30    ALT (SGPT)      9 - 46 U/L 33    Cholesterol      <200 mg/dL 126    HDL      > OR = 40 mg/dL 52    Triglycerides      <150 mg/dL 93    LDL Calculated      mg/dL (calc) 56    Chol/Hdlc Ratio      <5.0 (calc) 2.4    Non-HDL, Calculated      <130 mg/dL (calc) 74    Creatinine, Urine      20 - 320 mg/dL 129    Microalbumin, Ur      See Note: mg/dL 1.4    Microalb/Creat Ratio      <30 mcg/mg creat 11    Hemoglobin A1C      <5.7 % of total Hgb 5.8 (H)    Psa, Total      < OR = 4.00 ng/mL 0.45    TSH W/Reflex To Ft4      0.40 - 4.50 mIU/L 3.22       Legend:  (H) High  (L) Low      Vitals:    10/27/23 0803   BP: 120/80   BP Location: Left upper arm   Patient Position: Sitting   Pulse: 97   Resp: 16   Temp: 36.8 °C (98.2 °F)   TempSrc: Temporal   SpO2: 98%   Weight: 80.3 kg (177 lb)   Height: 1.778 m (5' 10\")         Physical Exam  Vitals and nursing note reviewed.   Constitutional:       Appearance: He is well-developed.   HENT:      Head: Normocephalic and atraumatic.      Right Ear: Tympanic membrane normal.      Left Ear: Tympanic membrane normal.   Eyes:      Extraocular Movements: Extraocular movements intact.      Conjunctiva/sclera: Conjunctivae normal.      Pupils: Pupils are equal, round, and reactive to light.   Cardiovascular:      Rate and Rhythm: Normal rate and regular rhythm.      Heart sounds: Normal heart sounds.   Pulmonary:      Effort: Pulmonary effort is normal. No " respiratory distress.      Breath sounds: Normal breath sounds.   Abdominal:      General: Bowel sounds are normal. There is no distension.      Palpations: Abdomen is soft.      Tenderness: There is no abdominal tenderness.   Musculoskeletal:         General: Normal range of motion.      Cervical back: Normal range of motion and neck supple.   Skin:     General: Skin is warm and dry.   Neurological:      Mental Status: He is alert and oriented to person, place, and time.         Assessment/Plan   Problem List Items Addressed This Visit        Circulatory    Essential hypertension    Current Assessment & Plan     Continue on current regimen         Relevant Medications    losartan (COZAAR) 25 mg tablet       Endocrine/Metabolic    Type 2 diabetes mellitus without complication, without long-term current use of insulin (CMS/[facility])    Current Assessment & Plan     We will reduce dose of Metformin ER to 500 mg twice daily.  Repeat HgbA1c in 3-4 months.           Relevant Medications    metFORMIN XR (GLUCOPHAGE-XR) 500 mg 24 hr tablet    Other Relevant Orders    Hemoglobin A1c       Other    Hypercholesterolemia    Current Assessment & Plan     Continue on statin therapy         Relevant Medications    atorvastatin (LIPITOR) 10 mg tablet    Annual physical exam - Primary    Current Assessment & Plan     Recommend well balanced diet with plenty of fruits and vegetables, whole grains, lean proteins and low fat dairy. Recommend an exercise program - goal should be at least 150 minutes/week of moderate exercise.         Labs reviewed with patient     Isaias Burr MD  10/27/2023

## 2023-11-14 DIAGNOSIS — I10 ESSENTIAL HYPERTENSION: ICD-10-CM

## 2023-11-14 RX ORDER — LOSARTAN POTASSIUM 25 MG/1
25 TABLET ORAL DAILY
Qty: 90 TABLET | Refills: 1 | Status: SHIPPED | OUTPATIENT
Start: 2023-11-14 | End: 2024-04-08

## 2024-02-02 DIAGNOSIS — E78.00 HYPERCHOLESTEROLEMIA: ICD-10-CM

## 2024-02-02 RX ORDER — ATORVASTATIN CALCIUM 10 MG/1
10 TABLET, FILM COATED ORAL NIGHTLY
Qty: 90 TABLET | Refills: 1 | Status: SHIPPED | OUTPATIENT
Start: 2024-02-02 | End: 2024-09-26

## 2024-02-22 LAB — HBA1C MFR BLD: 6.2 % OF TOTAL HGB

## 2024-04-06 DIAGNOSIS — I10 ESSENTIAL HYPERTENSION: ICD-10-CM

## 2024-04-08 RX ORDER — LOSARTAN POTASSIUM 25 MG/1
25 TABLET ORAL DAILY
Qty: 90 TABLET | Refills: 1 | Status: SHIPPED | OUTPATIENT
Start: 2024-04-08 | End: 2024-10-29 | Stop reason: SDUPTHER

## 2024-04-11 LAB — MLHC DIABETIC EYE EXAM (EXTERNAL): NORMAL

## 2024-05-24 DIAGNOSIS — E11.9 TYPE 2 DIABETES MELLITUS WITHOUT COMPLICATION, WITHOUT LONG-TERM CURRENT USE OF INSULIN (CMS/HCC): ICD-10-CM

## 2024-05-24 RX ORDER — METFORMIN HYDROCHLORIDE 500 MG/1
TABLET, EXTENDED RELEASE ORAL
Qty: 360 TABLET | Refills: 1 | Status: SHIPPED | OUTPATIENT
Start: 2024-05-24 | End: 2024-10-29 | Stop reason: SDUPTHER

## 2024-06-05 NOTE — PROGRESS NOTES
"Subjective   Home  Bps have normalized to 117/78 avg.  De=nies side effect.,      Patient ID: Samuel Patricia is a 53 y.o. male.  1965      HPI    The following have been reviewed and updated as appropriate in this visit:       Review of Systems    Objective     Vitals:    06/07/19 0803 06/07/19 0806   BP: 118/78 122/80   BP Location: Left upper arm Right upper arm   Patient Position: Sitting    Pulse: 100    Resp: 18    Temp: 36.8 °C (98.2 °F)    TempSrc: Oral    SpO2: 98%    Weight: 77.6 kg (171 lb)    Height: 1.753 m (5' 9.02\")      Body mass index is 25.24 kg/m².    Physical Exam    Assessment/Plan   Diagnoses and all orders for this visit:    Essential hypertension (Primary)        " Pediatric Therapy at [facility]  Pediatric Occupational Therapy Treatment Note    Patient: Kyle Augustin Today's Date: 24   MRN: [id number] Time:  Start Time: 1415  Stop Time: 1500  Total time in clinic (min): 45 minutes   : 2018 Therapist: Genesis Nava OT   Age: 6 y.o. Referring Provider: Maverick Henriquez MD         Diagnosis:  Encounter Diagnosis     ICD-10-CM    1. ADHD (attention deficit hyperactivity disorder), combined type  F90.2       2. Hypotonia  M62.89       3. Sensory processing difficulty  F88       4. Picky eater  R63.39       5. Developmental coordination disorder  F82             Insurance Visit Tracking:    Insurance:  AMA/CMS Eval/ Re-eval POC expires [place] Auth #/ Referral # Total   Visits  Start date  Expiration date Extension  Visit limitation Co-Insurance   CMS 23  N/A                                                               AUTH #:  Date 5/1 5/15 5/22 6/5            Visits  Authed:  Used 14 15 16 17             Remaining  -- -- -- -- -- -- -- -- -- -- -- -- --     SUBJECTIVE  Kyle Augustin arrived to pediatric occupational therapy treatment with Mother and brother who remained in session for part and walked outside to keep brother occupied for part of the session. Mother reported the following medical/social updates: He had his 6 yr check up and pediatrician commented that he might be on the Autism Spectrum.  Mom has follow up with developmental peds soon so she will discuss with them.    Others present include: N/A .      Patient Observations:  Generally cooperative, needing only minimal re-direction to tasks or need for toys to aid task completion  Impressions based on observation and/or parent report and Patient is responding to therapeutic strategies to improve participation     OBJECTIVE  Activity/Exercise Diary  Date: 24 Date: 24     Activity 1   Goal Area Code Activity 1 Goal Area Code   Scooter in sitting to get magnetic shapes to sort    Body awareness, motor planning, UE strength/endurance, visual perception, task completion   N Magnetic game at the table Self regulation, calming, organizing, heavy work, bilateral skills, motor planning   N   Activity 2   Goal Area Code Activity 2 Goal Area Code   24 pc interlocking puzzle with assistance at table   Self regulation, attention and focus, visual attention, perceptual skills, fine motor/bilateral skills    N Sitting on bolster while coloring picture taped to the wall Fine motor, visual motor coordination, visual attention, self regulation with non-preferred task  N   Activity 3   Goal Area Code Activity 3 Goal Area Code   Applesauce with a spoon   Utensil use, eating it differently than usual (it was a pouch that we squeezed into the bowl)   N Writing his name on the back with assistance Perceptual skills, visual motor coordination, letter formation, letter sizing N   Activity 4 Goal Area Code Activity 4 Goal Area Code   coloring/tracing activity at table Fine motor, visual motor, motor coordination, participation in fine motor tasks    N Swing in prone to collect/match colored popsicles   Self regulation/safety awareness with movement tasks, following directions, motor planning, bilateral skills N   Activity 5 Goal Area Code Activity 5 Goal Area Code      Arturo Meng     Motivation/reward during activities N   Activity 6 Goal Area Code Activity 6 Goal Area Code                 Code: (TE-Therapeutic  Ex)   (TA-Therapeutic Act)   (N-Neuromuscular)   (SC-Self Care)    Intervention Comments: Kyle had a hard time initially when his brother wanted to participate in the activity.  He got upset and ran to hide in bean bag.  Mom ended up taking brother for a walk and Kyle was able to transition back to the scooter task but continued to have a hard time so we cleaned up and came in the small room.  Initially he layed on the floor and said he wasn't playing.  So I got my snack and started the puzzle  without him and he immediately came to participate.  He needed assist to scan and find the matching pieces for the puzzle.  He was resistant to putting the applesauce in a bowl and had difficulty using the spoon to scoop and to use his lips to clean the spoon off to eat.  He decided he wanted to eat the rest of the applesauce with the spoon instead of from the pouch and skill did improve with practice.  Discussed with mom to bring in foods he likes but needs practice with utensils and we can work on that.     Other Interventions Performed: N/A    ASSESSMENT  Kyle Augustin tolerated pediatric occupational therapy treatment session well. Barriers to engagement include: negative behaviors and dysregulation initially.  Skilled pediatric occupational therapy intervention continues to be required at the recommended frequency due to deficits in self regulation/emotional regulation, coping strategies, flexibility with tasks, task completion, picky eating and fine motor/visual motor skills. During today’s treatment session, Kyle Augustin demonstrated progress in the areas of  eating a familiar food in a different way, using utensils and coloring at the end without avoidance.      Patient and Family Training and Education:  Topics: Home Exercise Program  Methods: Discussion and Demonstration  Response: Verbalized understanding  Recipient: Mother    PLAN  Continue per plan of care.

## 2024-09-26 DIAGNOSIS — E78.00 HYPERCHOLESTEROLEMIA: ICD-10-CM

## 2024-09-26 RX ORDER — ATORVASTATIN CALCIUM 10 MG/1
10 TABLET, FILM COATED ORAL NIGHTLY
Qty: 90 TABLET | Refills: 0 | Status: SHIPPED | OUTPATIENT
Start: 2024-09-26 | End: 2024-10-29 | Stop reason: SDUPTHER

## 2024-10-21 LAB
MLHC DIABETIC EYE EXAM (EXTERNAL): NORMAL
MLHC DIABETIC EYE EXAM (EXTERNAL): NORMAL

## 2024-10-23 SDOH — ECONOMIC STABILITY: INCOME INSECURITY: IN THE LAST 12 MONTHS, WAS THERE A TIME WHEN YOU WERE NOT ABLE TO PAY THE MORTGAGE OR RENT ON TIME?: NO

## 2024-10-23 SDOH — ECONOMIC STABILITY: FOOD INSECURITY: WITHIN THE PAST 12 MONTHS, THE FOOD YOU BOUGHT JUST DIDN'T LAST AND YOU DIDN'T HAVE MONEY TO GET MORE.: NEVER TRUE

## 2024-10-23 SDOH — ECONOMIC STABILITY: FOOD INSECURITY: WITHIN THE PAST 12 MONTHS, YOU WORRIED THAT YOUR FOOD WOULD RUN OUT BEFORE YOU GOT MONEY TO BUY MORE.: NEVER TRUE

## 2024-10-23 SDOH — ECONOMIC STABILITY: TRANSPORTATION INSECURITY
IN THE PAST 12 MONTHS, HAS THE LACK OF TRANSPORTATION KEPT YOU FROM MEDICAL APPOINTMENTS OR FROM GETTING MEDICATIONS?: NO

## 2024-10-23 SDOH — ECONOMIC STABILITY: TRANSPORTATION INSECURITY
IN THE PAST 12 MONTHS, HAS LACK OF TRANSPORTATION KEPT YOU FROM MEETINGS, WORK, OR FROM GETTING THINGS NEEDED FOR DAILY LIVING?: NO

## 2024-10-23 ASSESSMENT — SOCIAL DETERMINANTS OF HEALTH (SDOH): IN THE PAST 12 MONTHS, HAS THE ELECTRIC, GAS, OIL, OR WATER COMPANY THREATENED TO SHUT OFF SERVICE IN YOUR HOME?: NO

## 2024-10-29 ENCOUNTER — OFFICE VISIT (OUTPATIENT)
Dept: FAMILY MEDICINE | Facility: CLINIC | Age: 59
End: 2024-10-29
Payer: COMMERCIAL

## 2024-10-29 VITALS
RESPIRATION RATE: 16 BRPM | DIASTOLIC BLOOD PRESSURE: 80 MMHG | BODY MASS INDEX: 26.51 KG/M2 | OXYGEN SATURATION: 98 % | TEMPERATURE: 97.8 F | SYSTOLIC BLOOD PRESSURE: 115 MMHG | HEIGHT: 69 IN | HEART RATE: 94 BPM | WEIGHT: 179 LBS

## 2024-10-29 DIAGNOSIS — R22.1 LOCALIZED SWELLING, MASS OR LUMP OF NECK: ICD-10-CM

## 2024-10-29 DIAGNOSIS — E78.00 HYPERCHOLESTEROLEMIA: ICD-10-CM

## 2024-10-29 DIAGNOSIS — Z11.59 NEED FOR HEPATITIS C SCREENING TEST: ICD-10-CM

## 2024-10-29 DIAGNOSIS — E11.65 TYPE 2 DIABETES MELLITUS WITH HYPERGLYCEMIA, WITHOUT LONG-TERM CURRENT USE OF INSULIN (CMS/HCC): ICD-10-CM

## 2024-10-29 DIAGNOSIS — Z23 NEED FOR INFLUENZA VACCINATION: ICD-10-CM

## 2024-10-29 DIAGNOSIS — Z00.00 ANNUAL PHYSICAL EXAM: Primary | ICD-10-CM

## 2024-10-29 DIAGNOSIS — I10 ESSENTIAL HYPERTENSION: ICD-10-CM

## 2024-10-29 PROCEDURE — 3074F SYST BP LT 130 MM HG: CPT | Performed by: FAMILY MEDICINE

## 2024-10-29 PROCEDURE — 90656 IIV3 VACC NO PRSV 0.5 ML IM: CPT | Performed by: FAMILY MEDICINE

## 2024-10-29 PROCEDURE — 90471 IMMUNIZATION ADMIN: CPT | Performed by: FAMILY MEDICINE

## 2024-10-29 PROCEDURE — 3079F DIAST BP 80-89 MM HG: CPT | Performed by: FAMILY MEDICINE

## 2024-10-29 PROCEDURE — 99396 PREV VISIT EST AGE 40-64: CPT | Mod: 25 | Performed by: FAMILY MEDICINE

## 2024-10-29 PROCEDURE — 3008F BODY MASS INDEX DOCD: CPT | Performed by: FAMILY MEDICINE

## 2024-10-29 RX ORDER — ATORVASTATIN CALCIUM 10 MG/1
10 TABLET, FILM COATED ORAL NIGHTLY
Start: 2024-10-29 | End: 2024-12-09

## 2024-10-29 RX ORDER — LOSARTAN POTASSIUM 25 MG/1
25 TABLET ORAL DAILY
Start: 2024-10-29 | End: 2024-12-09

## 2024-10-29 RX ORDER — METFORMIN HYDROCHLORIDE 500 MG/1
1000 TABLET, EXTENDED RELEASE ORAL 2 TIMES DAILY WITH MEALS
Start: 2024-10-29 | End: 2025-02-06

## 2024-10-29 ASSESSMENT — ENCOUNTER SYMPTOMS
CONSTIPATION: 0
DIZZINESS: 0
NAUSEA: 0
FATIGUE: 0
COUGH: 0
ABDOMINAL PAIN: 0
VOMITING: 0
TROUBLE SWALLOWING: 0
PALPITATIONS: 0
LIGHT-HEADEDNESS: 0
DIARRHEA: 1
SHORTNESS OF BREATH: 0
FEVER: 0

## 2024-10-29 ASSESSMENT — PATIENT HEALTH QUESTIONNAIRE - PHQ9: SUM OF ALL RESPONSES TO PHQ9 QUESTIONS 1 & 2: 0

## 2024-10-29 NOTE — ASSESSMENT & PLAN NOTE
We will increase dose of Metformin ER to 1000 mg twice daily.  We also discussed using to SGLT-2 Inhibitor.  He would like 3 months to see how he does with the higher dose of Metformin.

## 2024-10-29 NOTE — PROGRESS NOTES
Daily Progress Note       Patient ID: Samuel Patricia is a 59 y.o. male.    HPI    59-year-old male presents for annual physical exam.     He is  with 3 children - 2 daughters and 1 son (twins).     He is a C[employer].      CT heart coronary calcium score done on 7/17/2020 with a score of 6     DM2 - currently on Metformin  mg twice daily.      Essential hypertension - currently on losartan 25 mg daily     Hypercholesterolemia - currently on atorvastatin 10 mg nightly      Colonoscopy-10/24/2016, repeat in 5 to 10 years; Cologuard Negative on 11/4/2022     Influenza - 10/16/2023     Shingrix-12/11/2019, 2/20/2020     Last dental exam - every 6 months      Last eye exam - 10/21/2024    The following have been reviewed and updated as appropriate in this visit:   Tobacco  Allergies  Meds  Problems  Med Hx  Surg Hx  Fam Hx       Review of Systems   Constitutional:  Negative for fatigue and fever.   HENT:  Negative for trouble swallowing.    Respiratory:  Negative for cough and shortness of breath.    Cardiovascular:  Negative for chest pain and palpitations.   Gastrointestinal:  Positive for diarrhea (associated with Metformin). Negative for abdominal pain, constipation, nausea and vomiting.   Neurological:  Negative for dizziness, syncope and light-headedness.   All other systems reviewed and are negative.            Current Outpatient Medications   Medication Sig Dispense Refill    atorvastatin (LIPITOR) 10 mg tablet Take 1 tablet (10 mg total) by mouth nightly.      blood-glucose meter misc test up to once daily in am before breakfast only.      CONTOUR NEXT TEST STRIPS strip TEST BLOOD SUGAR ONCE DAILY 100 strip 1    fexofenadine (ALLEGRA) 180 mg tablet 180 mg.      losartan (COZAAR) 25 mg tablet Take 1 tablet (25 mg total) by mouth daily.      metFORMIN XR (GLUCOPHAGE-XR) 500 mg 24 hr tablet Take 2 tablets (1,000 mg total) by mouth 2 (two) times a day with meals.       No current  facility-administered medications for this visit.     Past Medical History:   Diagnosis Date    Hx of gout      Family History   Problem Relation Name Age of Onset    Stroke Biological Mother      Stroke Biological Father      Diabetes Biological Father      Diabetes Biological Brother      Diabetes Biological Brother       Past Surgical History   Procedure Laterality Date    [place] tooth extraction       Social History     Tobacco Use    Smoking status: Never    Smokeless tobacco: Never   Vaping Use    Vaping status: Never Used   Substance Use Topics    Alcohol use: Yes     Comment: occ    Drug use: No     Allergies   Allergen Reactions    Acetaminophen     Aspirin      ASPIRIN    Ibuprofen     Penicillins Rash       Objective     Component      Latest Ref Rng 10/24/2024   WBC      3.8 - 10.8 Thousand/uL 7.3    RBC      4.20 - 5.80 Million/uL 4.81    Hemoglobin      13.2 - 17.1 g/dL 14.9    Hematocrit      38.5 - 50.0 % 45.4    MCV      80.0 - 100.0 fL 94.4    MCH      27.0 - 33.0 pg 31.0    MCHC      32.0 - 36.0 g/dL 32.8    RDW      11.0 - 15.0 % 11.8    Platelets      140 - 400 Thousand/uL 248    MPV      7.5 - 12.5 fL 10.2    Absolute Neutrophils      1,500 - 7,800 cells/uL 4,621    Absolute Lymphocytes      850 - 3,900 cells/uL 1,584    Absolute Monocytes      200 - 950 cells/uL 847    Absolute Eosinophils      15 - 500 cells/uL 197    Absolute Basophils      0 - 200 cells/uL 51    Neutrophils      % 63.3    Lymphocytes      % 21.7    Monocytes      % 11.6    Eosinophils      % 2.7    Basophils      % 0.7    Glucose      65 - 99 mg/dL 146 (H)    BUN      7 - 25 mg/dL 11    Creatinine      0.70 - 1.30 mg/dL 0.79    eGFR      > OR = 60 mL/min/1.73m2 103    Bun/Creatinine Ratio      6 - 22 (calc) SEE NOTE:    Sodium      135 - 146 mmol/L 137    Potassium, Bld      3.5 - 5.3 mmol/L 4.7    Chloride      98 - 110 mmol/L 100    CO2      20 - 32 mmol/L 28    Calcium      8.6 - 10.3 mg/dL 10.2    Total Protein      6.1  "- 8.1 g/dL 7.9    Albumin      3.6 - 5.1 g/dL 4.7    Globulin      1.9 - 3.7 g/dL (calc) 3.2    ALBUMIN/GLOBULIN RATIO      1.0 - 2.5 (calc) 1.5    Bilirubin, Total      0.2 - 1.2 mg/dL 0.8    Alkaline Phosphatase      35 - 144 U/L 67    AST (SGOT)      10 - 35 U/L 33    ALT (SGPT)      9 - 46 U/L 35    Color, Urine      YELLOW  YELLOW    Clarity, Urine      CLEAR  CLEAR    Specific Gravity, Urine      1.001 - 1.035  1.009    pH, Urine      5.0 - 8.0  5.5    Glucose, Urine      NEGATIVE  NEGATIVE    Bilirubin, Urine      NEGATIVE  NEGATIVE    Ketones, Urine      NEGATIVE  NEGATIVE    Blood, Urine      NEGATIVE  NEGATIVE    Protein, Urine      NEGATIVE  NEGATIVE    Nitrite, Urine      NEGATIVE  NEGATIVE    Leukocyte Esterase      NEGATIVE  NEGATIVE    WBC (UA)      < OR = 5 /HPF NONE SEEN    RBC (UA)      < OR = 2 /HPF NONE SEEN    SQUAMOUS EPITHELIAL CELLS (UA)      < OR = 5 /HPF NONE SEEN    Bacteria (UA)      NONE SEEN /HPF NONE SEEN    HYALINE CAST (UA)      NONE SEEN /LPF NONE SEEN    Note SEE COMMENT    Cholesterol      <200 mg/dL 146    HDL      > OR = 40 mg/dL 67    Triglycerides      <150 mg/dL 85    LDL Calculated      mg/dL (calc) 62    Chol/Hdlc Ratio      <5.0 (calc) 2.2    Non-HDL, Calculated      <130 mg/dL (calc) 79    Creatinine, Urine      20 - 320 mg/dL 71    Microalbumin, Ur      See Note: mg/dL 0.9    Microalb/Creat Ratio      <30 mg/g creat 13    Hemoglobin A1C      <5.7 % of total Hgb 7.3 (H)    Psa, Total      < OR = 4.00 ng/mL 0.76    TSH W/Reflex To Ft4      0.40 - 4.50 mIU/L 3.62       Legend:  (H) High    Vitals:    10/29/24 0758   BP: 115/80   BP Location: Left upper arm   Patient Position: Sitting   Pulse: 94   Resp: 16   Temp: 36.6 °C (97.8 °F)   TempSrc: Temporal   SpO2: 98%   Weight: 81.2 kg (179 lb)   Height: 1.753 m (5' 9\")         Physical Exam  Vitals and nursing note reviewed.   Constitutional:       Appearance: Normal appearance. He is well-developed.   HENT:      Head: " Normocephalic and atraumatic.      Right Ear: Tympanic membrane normal.      Left Ear: Tympanic membrane normal.   Eyes:      Extraocular Movements: Extraocular movements intact.      Conjunctiva/sclera: Conjunctivae normal.      Pupils: Pupils are equal, round, and reactive to light.   Cardiovascular:      Rate and Rhythm: Normal rate and regular rhythm.      Heart sounds: Normal heart sounds.   Pulmonary:      Effort: Pulmonary effort is normal. No respiratory distress.      Breath sounds: Normal breath sounds.   Abdominal:      General: Bowel sounds are normal. There is no distension.      Palpations: Abdomen is soft.      Tenderness: There is no abdominal tenderness.   Musculoskeletal:         General: Normal range of motion.      Cervical back: Normal range of motion and neck supple.   Skin:     General: Skin is warm and dry.   Neurological:      General: No focal deficit present.      Mental Status: He is alert and oriented to person, place, and time.   Psychiatric:         Mood and Affect: Mood normal.         Behavior: Behavior normal.         Thought Content: Thought content normal.         Judgment: Judgment normal.         Assessment/Plan   Problem List Items Addressed This Visit       Hypercholesterolemia    Current Assessment & Plan     Continue on statin therapy         Relevant Medications    atorvastatin (LIPITOR) 10 mg tablet    Essential hypertension    Current Assessment & Plan     Continue on current regimen         Relevant Medications    losartan (COZAAR) 25 mg tablet    Type 2 diabetes mellitus with hyperglycemia, without long-term current use of insulin (CMS/[facility])    Current Assessment & Plan     We will increase dose of Metformin ER to 1000 mg twice daily.  We also discussed using to SGLT-2 Inhibitor.  He would like 3 months to see how he does with the higher dose of Metformin.           Relevant Medications    metFORMIN XR (GLUCOPHAGE-XR) 500 mg 24 hr tablet    Other Relevant Orders     Hemoglobin A1c    Basic metabolic panel    Annual physical exam - Primary    Current Assessment & Plan     Recommend well balanced diet with plenty of fruits and vegetables, whole grains, lean proteins and low fat dairy. Recommend an exercise program - goal should be at least 150 minutes/week of moderate exercise.           Other Visit Diagnoses       Need for influenza vaccination        Relevant Orders    Influenza vaccine trivalent 6 mons and older IM (Flulaval) (Completed)    Need for hepatitis C screening test        Relevant Orders    Hepatitis C Antibody With Reflex    Localized swelling, mass or lump of neck        Right sided neck swelling.  Will check ultrasound of thyroid to further evaluate    Relevant Orders    ULTRASOUND THYROID            Isaias Burr MD  10/29/2024

## 2024-11-01 ENCOUNTER — HOSPITAL ENCOUNTER (OUTPATIENT)
Dept: RADIOLOGY | Age: 59
Discharge: HOME | End: 2024-11-01
Attending: FAMILY MEDICINE
Payer: COMMERCIAL

## 2024-11-01 DIAGNOSIS — R22.1 LOCALIZED SWELLING, MASS OR LUMP OF NECK: ICD-10-CM

## 2024-11-01 PROCEDURE — 76536 US EXAM OF HEAD AND NECK: CPT

## 2024-12-07 DIAGNOSIS — I10 ESSENTIAL HYPERTENSION: ICD-10-CM

## 2024-12-07 DIAGNOSIS — E78.00 HYPERCHOLESTEROLEMIA: ICD-10-CM

## 2024-12-09 RX ORDER — LOSARTAN POTASSIUM 25 MG/1
25 TABLET ORAL DAILY
Qty: 90 TABLET | Refills: 2 | Status: SHIPPED | OUTPATIENT
Start: 2024-12-09

## 2024-12-09 RX ORDER — ATORVASTATIN CALCIUM 10 MG/1
10 TABLET, FILM COATED ORAL NIGHTLY
Qty: 90 TABLET | Refills: 2 | Status: SHIPPED | OUTPATIENT
Start: 2024-12-09

## 2025-02-04 LAB
BUN SERPL-MCNC: 9 MG/DL (ref 7–25)
BUN/CREAT SERPL: ABNORMAL (CALC) (ref 6–22)
CALCIUM SERPL-MCNC: 9.4 MG/DL (ref 8.6–10.3)
CHLORIDE SERPL-SCNC: 103 MMOL/L (ref 98–110)
CO2 SERPL-SCNC: 27 MMOL/L (ref 20–32)
CREAT SERPL-MCNC: 0.72 MG/DL (ref 0.7–1.3)
EGFRCR SERPLBLD CKD-EPI 2021: 106 ML/MIN/1.73M2
GLUCOSE SERPL-MCNC: 135 MG/DL (ref 65–99)
HBA1C MFR BLD: 6.4 % OF TOTAL HGB
HCV AB SERPL QL IA: NORMAL
POTASSIUM SERPL-SCNC: 4.3 MMOL/L (ref 3.5–5.3)
SODIUM SERPL-SCNC: 140 MMOL/L (ref 135–146)

## 2025-03-31 ENCOUNTER — OFFICE VISIT (OUTPATIENT)
Dept: FAMILY MEDICINE | Facility: CLINIC | Age: 60
End: 2025-03-31
Payer: COMMERCIAL

## 2025-03-31 VITALS
RESPIRATION RATE: 16 BRPM | OXYGEN SATURATION: 98 % | DIASTOLIC BLOOD PRESSURE: 80 MMHG | HEART RATE: 88 BPM | BODY MASS INDEX: 25.62 KG/M2 | HEIGHT: 69 IN | WEIGHT: 173 LBS | SYSTOLIC BLOOD PRESSURE: 115 MMHG | TEMPERATURE: 97.8 F

## 2025-03-31 DIAGNOSIS — B35.6 TINEA CRURIS: Primary | ICD-10-CM

## 2025-03-31 DIAGNOSIS — E11.9 CONTROLLED TYPE 2 DIABETES MELLITUS WITHOUT COMPLICATION, WITHOUT LONG-TERM CURRENT USE OF INSULIN (CMS/HCC): ICD-10-CM

## 2025-03-31 DIAGNOSIS — H01.00A BLEPHARITIS OF UPPER AND LOWER EYELIDS OF BOTH EYES, UNSPECIFIED TYPE: ICD-10-CM

## 2025-03-31 DIAGNOSIS — L30.4 ECZEMA INTERTRIGO: ICD-10-CM

## 2025-03-31 DIAGNOSIS — H01.00B BLEPHARITIS OF UPPER AND LOWER EYELIDS OF BOTH EYES, UNSPECIFIED TYPE: ICD-10-CM

## 2025-03-31 PROCEDURE — 3079F DIAST BP 80-89 MM HG: CPT | Performed by: FAMILY MEDICINE

## 2025-03-31 PROCEDURE — 3008F BODY MASS INDEX DOCD: CPT | Performed by: FAMILY MEDICINE

## 2025-03-31 PROCEDURE — 99214 OFFICE O/P EST MOD 30 MIN: CPT | Performed by: FAMILY MEDICINE

## 2025-03-31 PROCEDURE — 3074F SYST BP LT 130 MM HG: CPT | Performed by: FAMILY MEDICINE

## 2025-03-31 RX ORDER — CLOTRIMAZOLE AND BETAMETHASONE DIPROPIONATE 10; .64 MG/G; MG/G
CREAM TOPICAL 2 TIMES DAILY
Qty: 45 G | Refills: 0 | Status: SHIPPED | OUTPATIENT
Start: 2025-03-31 | End: 2025-04-07

## 2025-03-31 RX ORDER — BLOOD SUGAR DIAGNOSTIC
STRIP MISCELLANEOUS
Qty: 100 STRIP | Refills: 1 | Status: SHIPPED | OUTPATIENT
Start: 2025-03-31

## 2025-03-31 RX ORDER — NEOMYCIN SULFATE, POLYMYXIN B SULFATE, BACITRACIN ZINC, HYDROCORTISONE 3.5; 10000; 400; 1 MG/G; [USP'U]/G; [USP'U]/G; MG/G
OINTMENT OPHTHALMIC 2 TIMES DAILY
Qty: 3.5 G | Refills: 0 | Status: SHIPPED | OUTPATIENT
Start: 2025-03-31 | End: 2025-04-10

## 2025-03-31 RX ORDER — TRIAMCINOLONE ACETONIDE 1 MG/G
1 OINTMENT TOPICAL 2 TIMES DAILY
Qty: 30 G | Refills: 0 | Status: SHIPPED | OUTPATIENT
Start: 2025-03-31 | End: 2025-04-07

## 2025-03-31 NOTE — PROGRESS NOTES
Consent obtained from patient and all parties present in the room? yes    I have obtained the consent of everyone present in the room to make an audio recording of this visit to assist me in documenting the encounter in the EMR.     Subjective     Patient ID: Samuel Patricia, : 1965 is a 59 y.o. male who presents for Follow-up    History of Present Illness  The patient presents for evaluation of rashes on his eyelids, groin area, and behind the knees, as well as a constant urge to swallow.    He has been experiencing a sensation akin to the presence of small glass fragments in his left lower eyelid, particularly noticeable during showers, for approximately a year. Recently, similar sensations have developed on the upper eyelids bilaterally. These symptoms were initially dismissed as irritation but have since escalated, with the affected areas becoming blistered and flaky over the past 3 to 4 weeks. He suspects a potential link to his Jardiance medication. He maintains good personal hygiene, showering twice daily.    He also reports an itchy sensation in his left groin area, accompanied by a small spot on the right side, which he noticed while watching TV on Wednesday night. The spots are described as large, round, red, and blotchy, with a transition from a reddish hue last week to a more brownish color currently. He has been applying hydrocortisone to these areas, which appears to be alleviating the itchiness. He did not apply the hydrocortisone yesterday or today and notes that the condition seems to be improving slightly.    Additionally, he has a persistent rash on the back of his knees.    He reports a constant urge to swallow, similar to postnasal drip, which is most noticeable when driving. This symptom subsides when he lies down and improves upon standing. Despite frequent attempts to clear his throat, he does not expectorate any mucus. He does not experience any runny nose, sneezing, congestion,  "or acid reflux symptoms.    He has been on Jardiance for about 45 days. His blood sugar is slightly higher in the morning than normal but lower at lunchtime. Previously, his blood sugar would be around 122 in the morning and 130 at lunchtime, but now it is 130 in the morning and 120 at lunchtime. He needs more test strips.    MEDICATIONS  Current: Jardiance, hydrocortisone    The following have been reviewed and updated as appropriate in this visit:   Allergies  Meds  Problems         Objective   Vitals:    03/31/25 1331   BP: 115/80   BP Location: Left upper arm   Patient Position: Sitting   Pulse: 88   Resp: 16   Temp: 36.6 °C (97.8 °F)   TempSrc: Temporal   SpO2: 98%   Weight: 78.5 kg (173 lb)   Height: 1.753 m (5' 9\")     Body mass index is 25.55 kg/m².    Physical Exam  Rash noted on the groin area and back of the knees.    Results         Assessment & Plan  1. Rash on the groin area.  The rash appears to be fungal in nature, characterized by circular patterns with raised edges. It is likely that the rash has spread from one side to the other due to contact. A prescription for Lotrisone cream (clotrimazole and betamethasone) will be provided for application on the groin area for 7 to 14 days. He is advised to ensure thorough drying of the area after washing.    2. Rash on the back of the knees.  The rash on the back of the knees resembles eczema. A prescription for a steroid ointment will be provided for application on the affected area for 7 to 14 days.    3. Rash on the eyelids.  A prescription for an eye ointment will be provided for application on the eyelids for 7 days.    4. Constant urge to swallow.  The sensation in the throat could be attributed to postnasal drip or acid reflux. A referral to an Ear, Nose, and Throat (ENT) specialist will be made for further evaluation of his sinuses and throat.    5. Diabetes mellitus.  He reports that his blood sugar levels are slightly higher in the morning and " lower at lunchtime since starting Jardiance. A prescription for Contour Next test strips will be provided. He is advised to continue monitoring his blood sugar levels and report any significant changes.

## 2025-06-12 ENCOUNTER — DOCUMENTATION (OUTPATIENT)
Dept: CASE MANAGEMENT | Facility: CLINIC | Age: 60
End: 2025-06-12
Payer: COMMERCIAL

## 2025-07-28 RX ORDER — EMPAGLIFLOZIN 25 MG/1
25 TABLET, FILM COATED ORAL DAILY
Qty: 90 TABLET | Refills: 1 | Status: SHIPPED | OUTPATIENT
Start: 2025-07-28